# Patient Record
Sex: FEMALE | Race: WHITE | NOT HISPANIC OR LATINO | Employment: PART TIME | URBAN - NONMETROPOLITAN AREA
[De-identification: names, ages, dates, MRNs, and addresses within clinical notes are randomized per-mention and may not be internally consistent; named-entity substitution may affect disease eponyms.]

---

## 2020-06-05 ENCOUNTER — HOSPITAL ENCOUNTER (INPATIENT)
Facility: HOSPITAL | Age: 20
LOS: 1 days | DRG: 817 | End: 2020-06-07
Attending: EMERGENCY MEDICINE | Admitting: FAMILY MEDICINE
Payer: COMMERCIAL

## 2020-06-05 ENCOUNTER — APPOINTMENT (OUTPATIENT)
Dept: RADIOLOGY | Facility: HOSPITAL | Age: 20
DRG: 817 | End: 2020-06-05
Payer: COMMERCIAL

## 2020-06-05 DIAGNOSIS — I45.81 PROLONGED QT INTERVAL SYNDROME: ICD-10-CM

## 2020-06-05 DIAGNOSIS — T50.902A INTENTIONAL DRUG OVERDOSE, INITIAL ENCOUNTER (HCC): Primary | ICD-10-CM

## 2020-06-05 DIAGNOSIS — T14.91XA SUICIDE ATTEMPT (HCC): ICD-10-CM

## 2020-06-05 DIAGNOSIS — N39.0 UTI (URINARY TRACT INFECTION): ICD-10-CM

## 2020-06-05 PROBLEM — D72.829 LEUKOCYTOSIS: Status: ACTIVE | Noted: 2020-06-05

## 2020-06-05 PROBLEM — E87.6 HYPOKALEMIA: Status: ACTIVE | Noted: 2020-06-05

## 2020-06-05 PROBLEM — N30.00 ACUTE CYSTITIS WITHOUT HEMATURIA: Status: ACTIVE | Noted: 2020-06-05

## 2020-06-05 PROBLEM — R94.31 PROLONGED Q-T INTERVAL ON ECG: Status: ACTIVE | Noted: 2020-06-05

## 2020-06-05 LAB
ALBUMIN SERPL BCP-MCNC: 4.2 G/DL (ref 3.5–5)
ALP SERPL-CCNC: 53 U/L (ref 46–116)
ALT SERPL W P-5'-P-CCNC: 16 U/L (ref 12–78)
AMPHETAMINES SERPL QL SCN: NEGATIVE
ANION GAP SERPL CALCULATED.3IONS-SCNC: 14 MMOL/L (ref 4–13)
ANION GAP SERPL CALCULATED.3IONS-SCNC: 9 MMOL/L (ref 4–13)
APAP SERPL-MCNC: <2 UG/ML (ref 10–20)
APTT PPP: 31 SECONDS (ref 23–37)
AST SERPL W P-5'-P-CCNC: 18 U/L (ref 5–45)
ATRIAL RATE: 94 BPM
BACTERIA UR QL AUTO: ABNORMAL /HPF
BARBITURATES UR QL: NEGATIVE
BASOPHILS # BLD AUTO: 0.06 THOUSANDS/ΜL (ref 0–0.1)
BASOPHILS # BLD AUTO: 0.11 THOUSANDS/ΜL (ref 0–0.1)
BASOPHILS NFR BLD AUTO: 1 % (ref 0–1)
BASOPHILS NFR BLD AUTO: 1 % (ref 0–1)
BENZODIAZ UR QL: NEGATIVE
BILIRUB SERPL-MCNC: 0.8 MG/DL (ref 0.2–1)
BILIRUB UR QL STRIP: NEGATIVE
BUN SERPL-MCNC: 4 MG/DL (ref 5–25)
BUN SERPL-MCNC: 6 MG/DL (ref 5–25)
CALCIUM SERPL-MCNC: 7.8 MG/DL (ref 8.3–10.1)
CALCIUM SERPL-MCNC: 8.5 MG/DL (ref 8.3–10.1)
CHLORIDE SERPL-SCNC: 103 MMOL/L (ref 100–108)
CHLORIDE SERPL-SCNC: 106 MMOL/L (ref 100–108)
CLARITY UR: ABNORMAL
CO2 SERPL-SCNC: 22 MMOL/L (ref 21–32)
CO2 SERPL-SCNC: 24 MMOL/L (ref 21–32)
COCAINE UR QL: NEGATIVE
COLOR UR: YELLOW
CREAT SERPL-MCNC: 0.47 MG/DL (ref 0.6–1.3)
CREAT SERPL-MCNC: 0.69 MG/DL (ref 0.6–1.3)
EOSINOPHIL # BLD AUTO: 0.04 THOUSAND/ΜL (ref 0–0.61)
EOSINOPHIL # BLD AUTO: 0.62 THOUSAND/ΜL (ref 0–0.61)
EOSINOPHIL NFR BLD AUTO: 0 % (ref 0–6)
EOSINOPHIL NFR BLD AUTO: 5 % (ref 0–6)
ERYTHROCYTE [DISTWIDTH] IN BLOOD BY AUTOMATED COUNT: 11.7 % (ref 11.6–15.1)
ERYTHROCYTE [DISTWIDTH] IN BLOOD BY AUTOMATED COUNT: 11.7 % (ref 11.6–15.1)
ETHANOL SERPL-MCNC: <3 MG/DL (ref 0–3)
EXT PREG TEST URINE: NEGATIVE
EXT. CONTROL ED NAV: NORMAL
GFR SERPL CREATININE-BSD FRML MDRD: 124 ML/MIN/1.73SQ M
GFR SERPL CREATININE-BSD FRML MDRD: 141 ML/MIN/1.73SQ M
GLUCOSE P FAST SERPL-MCNC: 105 MG/DL (ref 65–99)
GLUCOSE SERPL-MCNC: 105 MG/DL (ref 65–140)
GLUCOSE SERPL-MCNC: 124 MG/DL (ref 65–140)
GLUCOSE UR STRIP-MCNC: NEGATIVE MG/DL
HCT VFR BLD AUTO: 36.7 % (ref 34.8–46.1)
HCT VFR BLD AUTO: 38.5 % (ref 34.8–46.1)
HGB BLD-MCNC: 12.6 G/DL (ref 11.5–15.4)
HGB BLD-MCNC: 12.8 G/DL (ref 11.5–15.4)
HGB UR QL STRIP.AUTO: ABNORMAL
IMM GRANULOCYTES # BLD AUTO: 0.03 THOUSAND/UL (ref 0–0.2)
IMM GRANULOCYTES # BLD AUTO: 0.03 THOUSAND/UL (ref 0–0.2)
IMM GRANULOCYTES NFR BLD AUTO: 0 % (ref 0–2)
IMM GRANULOCYTES NFR BLD AUTO: 0 % (ref 0–2)
INR PPP: 1.04 (ref 0.84–1.19)
KETONES UR STRIP-MCNC: ABNORMAL MG/DL
LEUKOCYTE ESTERASE UR QL STRIP: ABNORMAL
LYMPHOCYTES # BLD AUTO: 1.65 THOUSANDS/ΜL (ref 0.6–4.47)
LYMPHOCYTES # BLD AUTO: 2.43 THOUSANDS/ΜL (ref 0.6–4.47)
LYMPHOCYTES NFR BLD AUTO: 17 % (ref 14–44)
LYMPHOCYTES NFR BLD AUTO: 21 % (ref 14–44)
MAGNESIUM SERPL-MCNC: 2.3 MG/DL (ref 1.6–2.6)
MCH RBC QN AUTO: 30.7 PG (ref 26.8–34.3)
MCH RBC QN AUTO: 31.2 PG (ref 26.8–34.3)
MCHC RBC AUTO-ENTMCNC: 33.2 G/DL (ref 31.4–37.4)
MCHC RBC AUTO-ENTMCNC: 34.3 G/DL (ref 31.4–37.4)
MCV RBC AUTO: 91 FL (ref 82–98)
MCV RBC AUTO: 92 FL (ref 82–98)
METHADONE UR QL: NEGATIVE
MONOCYTES # BLD AUTO: 0.21 THOUSAND/ΜL (ref 0.17–1.22)
MONOCYTES # BLD AUTO: 0.52 THOUSAND/ΜL (ref 0.17–1.22)
MONOCYTES NFR BLD AUTO: 2 % (ref 4–12)
MONOCYTES NFR BLD AUTO: 5 % (ref 4–12)
NEUTROPHILS # BLD AUTO: 7.87 THOUSANDS/ΜL (ref 1.85–7.62)
NEUTROPHILS # BLD AUTO: 7.87 THOUSANDS/ΜL (ref 1.85–7.62)
NEUTS SEG NFR BLD AUTO: 68 % (ref 43–75)
NEUTS SEG NFR BLD AUTO: 80 % (ref 43–75)
NITRITE UR QL STRIP: POSITIVE
NON-SQ EPI CELLS URNS QL MICRO: ABNORMAL /HPF
NRBC BLD AUTO-RTO: 0 /100 WBCS
NRBC BLD AUTO-RTO: 0 /100 WBCS
OPIATES UR QL SCN: NEGATIVE
P AXIS: 72 DEGREES
PCP UR QL: NEGATIVE
PH UR STRIP.AUTO: 7.5 [PH]
PHOSPHATE SERPL-MCNC: 3 MG/DL (ref 2.7–4.5)
PLATELET # BLD AUTO: 238 THOUSANDS/UL (ref 149–390)
PLATELET # BLD AUTO: 248 THOUSANDS/UL (ref 149–390)
PMV BLD AUTO: 10 FL (ref 8.9–12.7)
PMV BLD AUTO: 10 FL (ref 8.9–12.7)
POTASSIUM SERPL-SCNC: 3.3 MMOL/L (ref 3.5–5.3)
POTASSIUM SERPL-SCNC: 4.2 MMOL/L (ref 3.5–5.3)
PR INTERVAL: 150 MS
PROCALCITONIN SERPL-MCNC: <0.05 NG/ML
PROT SERPL-MCNC: 7.2 G/DL (ref 6.4–8.2)
PROT UR STRIP-MCNC: NEGATIVE MG/DL
PROTHROMBIN TIME: 13.6 SECONDS (ref 11.6–14.5)
QRS AXIS: 72 DEGREES
QRSD INTERVAL: 82 MS
QT INTERVAL: 408 MS
QTC INTERVAL: 510 MS
RBC # BLD AUTO: 4.04 MILLION/UL (ref 3.81–5.12)
RBC # BLD AUTO: 4.17 MILLION/UL (ref 3.81–5.12)
RBC #/AREA URNS AUTO: ABNORMAL /HPF
SALICYLATES SERPL-MCNC: <3 MG/DL (ref 3–20)
SARS-COV-2 RNA RESP QL NAA+PROBE: NEGATIVE
SODIUM SERPL-SCNC: 139 MMOL/L (ref 136–145)
SODIUM SERPL-SCNC: 139 MMOL/L (ref 136–145)
SP GR UR STRIP.AUTO: 1.01 (ref 1–1.03)
T WAVE AXIS: 33 DEGREES
THC UR QL: NEGATIVE
TROPONIN I SERPL-MCNC: <0.02 NG/ML
TROPONIN I SERPL-MCNC: <0.02 NG/ML
UROBILINOGEN UR QL STRIP.AUTO: 0.2 E.U./DL
VENTRICULAR RATE: 94 BPM
WBC # BLD AUTO: 11.58 THOUSAND/UL (ref 4.31–10.16)
WBC # BLD AUTO: 9.86 THOUSAND/UL (ref 4.31–10.16)
WBC #/AREA URNS AUTO: ABNORMAL /HPF

## 2020-06-05 PROCEDURE — 93005 ELECTROCARDIOGRAM TRACING: CPT

## 2020-06-05 PROCEDURE — 84484 ASSAY OF TROPONIN QUANT: CPT | Performed by: FAMILY MEDICINE

## 2020-06-05 PROCEDURE — 84484 ASSAY OF TROPONIN QUANT: CPT | Performed by: EMERGENCY MEDICINE

## 2020-06-05 PROCEDURE — 36415 COLL VENOUS BLD VENIPUNCTURE: CPT | Performed by: EMERGENCY MEDICINE

## 2020-06-05 PROCEDURE — 83735 ASSAY OF MAGNESIUM: CPT | Performed by: PHYSICIAN ASSISTANT

## 2020-06-05 PROCEDURE — 99285 EMERGENCY DEPT VISIT HI MDM: CPT

## 2020-06-05 PROCEDURE — 80307 DRUG TEST PRSMV CHEM ANLYZR: CPT | Performed by: EMERGENCY MEDICINE

## 2020-06-05 PROCEDURE — 84100 ASSAY OF PHOSPHORUS: CPT | Performed by: PHYSICIAN ASSISTANT

## 2020-06-05 PROCEDURE — 85025 COMPLETE CBC W/AUTO DIFF WBC: CPT | Performed by: EMERGENCY MEDICINE

## 2020-06-05 PROCEDURE — 80048 BASIC METABOLIC PNL TOTAL CA: CPT | Performed by: FAMILY MEDICINE

## 2020-06-05 PROCEDURE — 99285 EMERGENCY DEPT VISIT HI MDM: CPT | Performed by: EMERGENCY MEDICINE

## 2020-06-05 PROCEDURE — 84145 PROCALCITONIN (PCT): CPT | Performed by: PHYSICIAN ASSISTANT

## 2020-06-05 PROCEDURE — 71045 X-RAY EXAM CHEST 1 VIEW: CPT

## 2020-06-05 PROCEDURE — 99202 OFFICE O/P NEW SF 15 MIN: CPT | Performed by: NURSE PRACTITIONER

## 2020-06-05 PROCEDURE — 87635 SARS-COV-2 COVID-19 AMP PRB: CPT | Performed by: EMERGENCY MEDICINE

## 2020-06-05 PROCEDURE — 80053 COMPREHEN METABOLIC PANEL: CPT | Performed by: EMERGENCY MEDICINE

## 2020-06-05 PROCEDURE — 80320 DRUG SCREEN QUANTALCOHOLS: CPT | Performed by: EMERGENCY MEDICINE

## 2020-06-05 PROCEDURE — 81025 URINE PREGNANCY TEST: CPT | Performed by: EMERGENCY MEDICINE

## 2020-06-05 PROCEDURE — 81001 URINALYSIS AUTO W/SCOPE: CPT | Performed by: PHYSICIAN ASSISTANT

## 2020-06-05 PROCEDURE — 85025 COMPLETE CBC W/AUTO DIFF WBC: CPT | Performed by: PHYSICIAN ASSISTANT

## 2020-06-05 PROCEDURE — 85610 PROTHROMBIN TIME: CPT | Performed by: EMERGENCY MEDICINE

## 2020-06-05 PROCEDURE — 99219 PR INITIAL OBSERVATION CARE/DAY 50 MINUTES: CPT | Performed by: FAMILY MEDICINE

## 2020-06-05 PROCEDURE — 93010 ELECTROCARDIOGRAM REPORT: CPT | Performed by: INTERNAL MEDICINE

## 2020-06-05 PROCEDURE — 85730 THROMBOPLASTIN TIME PARTIAL: CPT | Performed by: EMERGENCY MEDICINE

## 2020-06-05 PROCEDURE — 80329 ANALGESICS NON-OPIOID 1 OR 2: CPT | Performed by: EMERGENCY MEDICINE

## 2020-06-05 RX ORDER — ONDANSETRON 2 MG/ML
1 INJECTION INTRAMUSCULAR; INTRAVENOUS ONCE
Status: COMPLETED | OUTPATIENT
Start: 2020-06-05 | End: 2020-06-05

## 2020-06-05 RX ORDER — POTASSIUM CHLORIDE 20 MEQ/1
20 TABLET, EXTENDED RELEASE ORAL ONCE
Status: COMPLETED | OUTPATIENT
Start: 2020-06-05 | End: 2020-06-05

## 2020-06-05 RX ORDER — LORAZEPAM 2 MG/ML
1 INJECTION INTRAMUSCULAR ONCE
Status: COMPLETED | OUTPATIENT
Start: 2020-06-05 | End: 2020-06-05

## 2020-06-05 RX ORDER — KETOROLAC TROMETHAMINE 30 MG/ML
15 INJECTION, SOLUTION INTRAMUSCULAR; INTRAVENOUS EVERY 6 HOURS PRN
Status: DISCONTINUED | OUTPATIENT
Start: 2020-06-05 | End: 2020-06-07 | Stop reason: HOSPADM

## 2020-06-05 RX ORDER — ACETAMINOPHEN 325 MG/1
650 TABLET ORAL ONCE
Status: COMPLETED | OUTPATIENT
Start: 2020-06-05 | End: 2020-06-05

## 2020-06-05 RX ORDER — SODIUM CHLORIDE 9 MG/ML
125 INJECTION, SOLUTION INTRAVENOUS CONTINUOUS
Status: DISCONTINUED | OUTPATIENT
Start: 2020-06-05 | End: 2020-06-06

## 2020-06-05 RX ORDER — MAGNESIUM SULFATE HEPTAHYDRATE 40 MG/ML
2 INJECTION, SOLUTION INTRAVENOUS ONCE
Status: COMPLETED | OUTPATIENT
Start: 2020-06-05 | End: 2020-06-05

## 2020-06-05 RX ORDER — CEPHALEXIN 250 MG/1
500 CAPSULE ORAL ONCE
Status: COMPLETED | OUTPATIENT
Start: 2020-06-05 | End: 2020-06-05

## 2020-06-05 RX ADMIN — ACETAMINOPHEN 650 MG: 325 TABLET, FILM COATED ORAL at 13:13

## 2020-06-05 RX ADMIN — KETOROLAC TROMETHAMINE 15 MG: 30 INJECTION, SOLUTION INTRAMUSCULAR at 08:28

## 2020-06-05 RX ADMIN — SODIUM CHLORIDE 125 ML/HR: 0.9 INJECTION, SOLUTION INTRAVENOUS at 04:30

## 2020-06-05 RX ADMIN — MAGNESIUM SULFATE HEPTAHYDRATE 2 G: 40 INJECTION, SOLUTION INTRAVENOUS at 01:34

## 2020-06-05 RX ADMIN — CEPHALEXIN 500 MG: 250 CAPSULE ORAL at 05:59

## 2020-06-05 RX ADMIN — SODIUM CHLORIDE 1000 ML: 0.9 INJECTION, SOLUTION INTRAVENOUS at 01:32

## 2020-06-05 RX ADMIN — POTASSIUM CHLORIDE 20 MEQ: 1500 TABLET, EXTENDED RELEASE ORAL at 05:59

## 2020-06-05 RX ADMIN — POTASSIUM CHLORIDE 20 MEQ: 1500 TABLET, EXTENDED RELEASE ORAL at 01:35

## 2020-06-05 RX ADMIN — LORAZEPAM 1 MG: 2 INJECTION INTRAMUSCULAR; INTRAVENOUS at 03:18

## 2020-06-06 ENCOUNTER — TELEPHONE (OUTPATIENT)
Dept: CASE MANAGEMENT | Facility: HOSPITAL | Age: 20
End: 2020-06-06

## 2020-06-06 PROCEDURE — 93005 ELECTROCARDIOGRAM TRACING: CPT

## 2020-06-06 PROCEDURE — 99232 SBSQ HOSP IP/OBS MODERATE 35: CPT | Performed by: FAMILY MEDICINE

## 2020-06-06 RX ORDER — ONDANSETRON 4 MG/1
4 TABLET, ORALLY DISINTEGRATING ORAL EVERY 6 HOURS PRN
Status: DISCONTINUED | OUTPATIENT
Start: 2020-06-06 | End: 2020-06-07 | Stop reason: HOSPADM

## 2020-06-06 RX ORDER — ACETAMINOPHEN 325 MG/1
650 TABLET ORAL EVERY 6 HOURS PRN
Status: DISCONTINUED | OUTPATIENT
Start: 2020-06-06 | End: 2020-06-07 | Stop reason: HOSPADM

## 2020-06-06 RX ORDER — CEPHALEXIN 250 MG/1
500 CAPSULE ORAL EVERY 8 HOURS SCHEDULED
Status: DISCONTINUED | OUTPATIENT
Start: 2020-06-06 | End: 2020-06-07 | Stop reason: HOSPADM

## 2020-06-06 RX ADMIN — SODIUM CHLORIDE 125 ML/HR: 0.9 INJECTION, SOLUTION INTRAVENOUS at 08:43

## 2020-06-06 RX ADMIN — CEPHALEXIN 500 MG: 250 CAPSULE ORAL at 14:54

## 2020-06-06 RX ADMIN — CEPHALEXIN 500 MG: 250 CAPSULE ORAL at 21:16

## 2020-06-06 RX ADMIN — ACETAMINOPHEN 650 MG: 325 TABLET, FILM COATED ORAL at 09:50

## 2020-06-06 RX ADMIN — SODIUM CHLORIDE 125 ML/HR: 0.9 INJECTION, SOLUTION INTRAVENOUS at 00:35

## 2020-06-06 RX ADMIN — ACETAMINOPHEN 650 MG: 325 TABLET, FILM COATED ORAL at 16:14

## 2020-06-06 RX ADMIN — CEPHALEXIN 500 MG: 250 CAPSULE ORAL at 09:33

## 2020-06-06 RX ADMIN — KETOROLAC TROMETHAMINE 15 MG: 30 INJECTION, SOLUTION INTRAMUSCULAR at 18:21

## 2020-06-06 RX ADMIN — ONDANSETRON 4 MG: 4 TABLET, ORALLY DISINTEGRATING ORAL at 10:02

## 2020-06-07 VITALS
RESPIRATION RATE: 18 BRPM | DIASTOLIC BLOOD PRESSURE: 68 MMHG | WEIGHT: 99.43 LBS | BODY MASS INDEX: 18.3 KG/M2 | TEMPERATURE: 98.1 F | OXYGEN SATURATION: 98 % | HEIGHT: 62 IN | HEART RATE: 64 BPM | SYSTOLIC BLOOD PRESSURE: 119 MMHG

## 2020-06-07 LAB
ATRIAL RATE: 72 BPM
P AXIS: 70 DEGREES
PR INTERVAL: 158 MS
QRS AXIS: 65 DEGREES
QRSD INTERVAL: 78 MS
QT INTERVAL: 424 MS
QTC INTERVAL: 464 MS
T WAVE AXIS: 36 DEGREES
VENTRICULAR RATE: 72 BPM

## 2020-06-07 PROCEDURE — 99239 HOSP IP/OBS DSCHRG MGMT >30: CPT | Performed by: FAMILY MEDICINE

## 2020-06-07 PROCEDURE — 93010 ELECTROCARDIOGRAM REPORT: CPT | Performed by: INTERNAL MEDICINE

## 2020-06-07 RX ORDER — CEPHALEXIN 500 MG/1
500 CAPSULE ORAL EVERY 8 HOURS SCHEDULED
Qty: 6 CAPSULE | Refills: 0
Start: 2020-06-07 | End: 2020-06-09

## 2020-06-07 RX ADMIN — ONDANSETRON 4 MG: 4 TABLET, ORALLY DISINTEGRATING ORAL at 08:11

## 2020-06-07 RX ADMIN — CEPHALEXIN 500 MG: 250 CAPSULE ORAL at 05:29

## 2020-06-08 LAB
ATRIAL RATE: 107 BPM
P AXIS: 75 DEGREES
PR INTERVAL: 160 MS
QRS AXIS: 78 DEGREES
QRSD INTERVAL: 78 MS
QT INTERVAL: 364 MS
QTC INTERVAL: 485 MS
T WAVE AXIS: 55 DEGREES
VENTRICULAR RATE: 107 BPM

## 2020-06-08 PROCEDURE — 93010 ELECTROCARDIOGRAM REPORT: CPT | Performed by: INTERNAL MEDICINE

## 2020-07-25 ENCOUNTER — HOSPITAL ENCOUNTER (EMERGENCY)
Facility: HOSPITAL | Age: 20
Discharge: HOME | End: 2020-07-25
Admitting: EMERGENCY MEDICINE
Payer: COMMERCIAL

## 2020-07-25 VITALS
BODY MASS INDEX: 17.66 KG/M2 | WEIGHT: 96 LBS | HEIGHT: 62 IN | OXYGEN SATURATION: 99 % | HEART RATE: 87 BPM | SYSTOLIC BLOOD PRESSURE: 124 MMHG | TEMPERATURE: 99.7 F | DIASTOLIC BLOOD PRESSURE: 58 MMHG | RESPIRATION RATE: 18 BRPM

## 2020-07-25 LAB — SARS-COV-2 RNA RESP QL NAA+PROBE: NEGATIVE

## 2020-07-25 PROCEDURE — 87635 SARS-COV-2 COVID-19 AMP PRB: CPT | Performed by: EMERGENCY MEDICINE

## 2020-07-25 PROCEDURE — 99283 EMERGENCY DEPT VISIT LOW MDM: CPT

## 2020-07-25 PROCEDURE — NC001 PR NO CHARGE: Performed by: EMERGENCY MEDICINE

## 2020-07-25 RX ORDER — ARIPIPRAZOLE 15 MG/1
15 TABLET ORAL DAILY
COMMUNITY
End: 2021-03-22

## 2020-07-25 RX ORDER — OMEGA-3S/DHA/EPA/FISH OIL/D3 300MG-1000
CAPSULE ORAL DAILY
COMMUNITY
End: 2022-07-08 | Stop reason: ALTCHOICE

## 2020-07-25 RX ORDER — SERTRALINE HYDROCHLORIDE 100 MG/1
100 TABLET, FILM COATED ORAL DAILY
COMMUNITY
End: 2022-07-08 | Stop reason: ALTCHOICE

## 2020-07-25 NOTE — ED NOTES
Pt  States was exposed to a person that had +Covid 4 days ago and just found out today  Denies any complaints  No fever, SOB, or cough  Mask on from arrival   Pt  Placed in car and was instructed that we will come and get her for ED treatment  Agreeable to same       Nadeem Bernabe RN  07/25/20 4720

## 2020-07-26 NOTE — ED NOTES
Pt came in for covid testing only, no S? S of Covid 19, was around someone who had it Dr Amelia Rojo aware     Lin Cote, RN  07/25/20 2004

## 2020-07-31 NOTE — ED PROVIDER NOTES
Patient inadvertently left after triage and first nurse orders but prior to being seen by provider       Yinka Cunningham MD  07/31/20 6291

## 2020-09-19 ENCOUNTER — HOSPITAL ENCOUNTER (EMERGENCY)
Facility: HOSPITAL | Age: 20
Discharge: HOME | End: 2020-09-19
Attending: EMERGENCY MEDICINE | Admitting: EMERGENCY MEDICINE
Payer: COMMERCIAL

## 2020-09-19 VITALS
HEIGHT: 62 IN | BODY MASS INDEX: 19.32 KG/M2 | HEART RATE: 98 BPM | OXYGEN SATURATION: 96 % | SYSTOLIC BLOOD PRESSURE: 123 MMHG | WEIGHT: 105 LBS | DIASTOLIC BLOOD PRESSURE: 66 MMHG | TEMPERATURE: 99 F | RESPIRATION RATE: 18 BRPM

## 2020-09-19 DIAGNOSIS — J20.9 ACUTE BRONCHITIS WITH BRONCHOSPASM: Primary | ICD-10-CM

## 2020-09-19 PROCEDURE — 99284 EMERGENCY DEPT VISIT MOD MDM: CPT | Performed by: EMERGENCY MEDICINE

## 2020-09-19 PROCEDURE — 99283 EMERGENCY DEPT VISIT LOW MDM: CPT

## 2020-09-19 PROCEDURE — U0003 INFECTIOUS AGENT DETECTION BY NUCLEIC ACID (DNA OR RNA); SEVERE ACUTE RESPIRATORY SYNDROME CORONAVIRUS 2 (SARS-COV-2) (CORONAVIRUS DISEASE [COVID-19]), AMPLIFIED PROBE TECHNIQUE, MAKING USE OF HIGH THROUGHPUT TECHNOLOGIES AS DESCRIBED BY CMS-2020-01-R: HCPCS | Performed by: EMERGENCY MEDICINE

## 2020-09-19 RX ORDER — ALBUTEROL SULFATE 90 UG/1
2 AEROSOL, METERED RESPIRATORY (INHALATION) ONCE
Status: COMPLETED | OUTPATIENT
Start: 2020-09-19 | End: 2020-09-19

## 2020-09-19 RX ORDER — AZITHROMYCIN 250 MG/1
250 TABLET, FILM COATED ORAL DAILY
Qty: 4 TABLET | Refills: 0 | Status: SHIPPED | OUTPATIENT
Start: 2020-09-19 | End: 2020-09-23

## 2020-09-19 RX ORDER — DEXAMETHASONE 2 MG/1
TABLET ORAL
Qty: 5 TABLET | Refills: 0 | Status: SHIPPED | OUTPATIENT
Start: 2020-09-19 | End: 2022-07-08 | Stop reason: ALTCHOICE

## 2020-09-19 RX ORDER — ALBUTEROL SULFATE 90 UG/1
2 AEROSOL, METERED RESPIRATORY (INHALATION) EVERY 4 HOURS PRN
Qty: 1 INHALER | Refills: 0 | Status: SHIPPED | OUTPATIENT
Start: 2020-09-19 | End: 2021-09-19

## 2020-09-19 RX ORDER — AZITHROMYCIN 250 MG/1
500 TABLET, FILM COATED ORAL ONCE
Status: COMPLETED | OUTPATIENT
Start: 2020-09-19 | End: 2020-09-19

## 2020-09-19 RX ORDER — PROMETHAZINE HYDROCHLORIDE 25 MG/1
25 TABLET ORAL EVERY 6 HOURS PRN
Qty: 6 TABLET | Refills: 0 | Status: SHIPPED | OUTPATIENT
Start: 2020-09-19 | End: 2022-07-08 | Stop reason: ALTCHOICE

## 2020-09-19 RX ADMIN — DEXAMETHASONE SODIUM PHOSPHATE 10 MG: 10 INJECTION, SOLUTION INTRAMUSCULAR; INTRAVENOUS at 22:44

## 2020-09-19 RX ADMIN — AZITHROMYCIN 500 MG: 250 TABLET, FILM COATED ORAL at 22:43

## 2020-09-19 RX ADMIN — ALBUTEROL SULFATE 2 PUFF: 90 AEROSOL, METERED RESPIRATORY (INHALATION) at 22:44

## 2020-09-20 NOTE — ED PROVIDER NOTES
History  Chief Complaint   Patient presents with    URI     patient reports sore throat, cough, fever, reunny nose, and vomiting since yesterday  she last took nyquial at 80     68-year-old female with 2 day history of cough sore throat and congestion  She has some sinus pressure no earache her T-max was approximately 100 7° earlier in the day coughing is to the point of vomiting overall cough nonproductive she denies any hematemesis she has been able to tolerate fluids she denies abdominal pain or diarrhea no history of rash she does not feel short of breath no pleuritic pain but has noted some wheezing  She denies any lightheadedness her last menstrual period was 2-3 weeks ago she vapes  Prior to Admission Medications   Prescriptions Last Dose Informant Patient Reported? Taking? ARIPiprazole (ABILIFY) 15 mg tablet 9/19/2020 at Unknown time Self Yes Yes   Sig: Take 15 mg by mouth daily   DM-Doxylamine-Acetaminophen (NYQUIL COLD & FLU PO) 9/19/2020 at 2030  Yes Yes   Sig: Take by mouth   cholecalciferol (VITAMIN D3) 400 units tablet 9/19/2020 at Unknown time Self Yes Yes   Sig: Take by mouth daily   sertraline (ZOLOFT) 100 mg tablet 9/19/2020 at Unknown time Self Yes Yes   Sig: Take 100 mg by mouth daily      Facility-Administered Medications: None       History reviewed  No pertinent past medical history  History reviewed  No pertinent surgical history  History reviewed  No pertinent family history  I have reviewed and agree with the history as documented  E-Cigarette/Vaping    E-Cigarette Use Current Every Day User      E-Cigarette/Vaping Substances    Nicotine Yes     THC No     CBD No     Flavoring Yes     Other No     Unknown No      Social History     Tobacco Use    Smoking status: Never Smoker    Smokeless tobacco: Never Used   Substance Use Topics    Alcohol use: Yes     Frequency: Monthly or less     Drinks per session: 1 or 2     Binge frequency: Never    Drug use:  Yes Types: Marijuana     Comment: 2-3x a week        Review of Systems   Constitutional: Negative for activity change, appetite change, chills and fever  HENT: Positive for congestion, sinus pressure and sore throat  Negative for ear pain, facial swelling, hearing loss, mouth sores, sinus pain, sneezing, trouble swallowing and voice change  Eyes: Negative for discharge  Respiratory: Positive for cough and wheezing  Negative for chest tightness and shortness of breath  Cardiovascular: Negative for chest pain and leg swelling  Gastrointestinal: Positive for vomiting  Negative for abdominal pain, diarrhea and nausea  Genitourinary: Negative for decreased urine volume, difficulty urinating and dysuria  Musculoskeletal: Negative for back pain, neck pain and neck stiffness  Skin: Negative for rash  Neurological: Negative for dizziness, weakness, light-headedness and headaches  Psychiatric/Behavioral: Negative for confusion  All other systems reviewed and are negative  Physical Exam  Physical Exam  Vitals signs and nursing note reviewed  Constitutional:       General: She is not in acute distress  Appearance: She is well-developed  She is not diaphoretic  Comments: texting on phone   HENT:      Head: Normocephalic and atraumatic  Comments: No tenderness with palpation of sinuses     Right Ear: Tympanic membrane, ear canal and external ear normal       Left Ear: Tympanic membrane, ear canal and external ear normal       Nose: Nose normal       Mouth/Throat:      Mouth: Mucous membranes are moist       Pharynx: No oropharyngeal exudate or posterior oropharyngeal erythema  Eyes:      General:         Right eye: No discharge  Left eye: No discharge  Conjunctiva/sclera: Conjunctivae normal       Pupils: Pupils are equal, round, and reactive to light  Neck:      Musculoskeletal: Normal range of motion and neck supple  No neck rigidity or muscular tenderness  Comments: No midline or paraspinous tenderness  Cardiovascular:      Rate and Rhythm: Normal rate and regular rhythm  Heart sounds: Normal heart sounds  Pulmonary:      Effort: Pulmonary effort is normal  No respiratory distress  Breath sounds: Wheezing present  Comments: sats 98%  Abdominal:      General: Bowel sounds are normal  There is no distension  Palpations: Abdomen is soft  There is no mass  Tenderness: There is no abdominal tenderness  There is no right CVA tenderness, left CVA tenderness, guarding or rebound  Comments: Back no midline or CVA tenderness   Musculoskeletal: Normal range of motion  General: No swelling, tenderness or deformity  Right lower leg: No edema  Left lower leg: No edema  Lymphadenopathy:      Cervical: No cervical adenopathy  Skin:     General: Skin is warm and dry  Neurological:      Mental Status: She is alert and oriented to person, place, and time  Cranial Nerves: No cranial nerve deficit  Motor: No abnormal muscle tone        Coordination: Coordination normal       Comments: Gait steady         Vital Signs  ED Triage Vitals [09/19/20 2211]   Temperature Pulse Respirations Blood Pressure SpO2   99 °F (37 2 °C) 98 18 123/66 96 %      Temp Source Heart Rate Source Patient Position - Orthostatic VS BP Location FiO2 (%)   Temporal Monitor Sitting Left arm --      Pain Score       4           Vitals:    09/19/20 2211   BP: 123/66   Pulse: 98   Patient Position - Orthostatic VS: Sitting         Visual Acuity      ED Medications  Medications   azithromycin (ZITHROMAX) tablet 500 mg (500 mg Oral Given 9/19/20 2243)   dexamethasone 10 mg/mL oral liquid 10 mg 1 mL (10 mg Oral Given 9/19/20 2244)   albuterol (PROVENTIL HFA,VENTOLIN HFA) inhaler 2 puff (2 puffs Inhalation Given 9/19/20 2244)       Diagnostic Studies  Results Reviewed     Procedure Component Value Units Date/Time    Novel Coronavirus (COVID-19), PCR [facility] [[id number]] Collected:  09/19/20 2247    Lab Status: In process Specimen:  Nares from Nose Updated:  09/19/20 2251                 No orders to display              Procedures  Procedures         ED Course  ED Course as of Sep 20 0009   Sat Sep 19, 2020   2246 Prior records reviewed patient had prolonged QTC of 510 secondary to SSRI overdose 6 patient had serial EKGs which were QTC normalized to 646  Will prescribe Zithromax but used Phenergan for nausea                               SBIRT 22yo+      Most Recent Value   SBIRT (22 yo +)   In order to provide better care to our patients, we are screening all of our patients for alcohol and drug use  Would it be okay to ask you these screening questions? Yes Filed at: 09/19/2020 2223   Initial Alcohol Screen: US AUDIT-C    1  How often do you have a drink containing alcohol? 1 Filed at: 09/19/2020 2223   2  How many drinks containing alcohol do you have on a typical day you are drinking? 0 Filed at: 09/19/2020 2223   3a  Male UNDER 65: How often do you have five or more drinks on one occasion? 0 Filed at: 09/19/2020 2223   3b  FEMALE Any Age, or MALE 65+: How often do you have 4 or more drinks on one occassion? 0 Filed at: 09/19/2020 2223   Audit-C Score  1 Filed at: 09/19/2020 2223   MANAS: How many times in the past year have you    Used an illegal drug or used a prescription medication for non-medical reasons? Never Filed at: 09/19/2020 2223                  MDM  Number of Diagnoses or Management Options  Acute bronchitis with bronchospasm:   Diagnosis management comments: Mdm:  History of vaping with no respiratory distress but evidence of wheezing will cover with Zithromax  Burst with steroids administer albuterol will have Phenergan available on an as-needed basis for nausea reviewed symptoms may be consistent with COVID infection patient is agreeable to proceeding with a swab    Reviewed isolation precautions      Disposition  Final diagnoses:   Acute bronchitis with bronchospasm     Time reflects when diagnosis was documented in both MDM as applicable and the Disposition within this note     Time User Action Codes Description Comment    9/19/2020 10:35 PM Chicagosolitario Pineda Add [J20 9] Acute bronchitis with bronchospasm       ED Disposition     ED Disposition Condition Date/Time Comment    Discharge Stable Sat Sep 19, 2020 10:44 PM Linh Zavala discharge to home/self care  Follow-up Information     Follow up With Specialties Details Why [address], DO Family Medicine In 3 days if not improved [address] [facility]  [address]  [address] [place] [address]  [phone]            Discharge Medication List as of 9/19/2020 10:46 PM      START taking these medications    Details   albuterol (PROVENTIL HFA,VENTOLIN HFA) 90 mcg/act inhaler Inhale 2 puffs every 4 (four) hours as needed for wheezing, Starting Sat 9/19/2020, Until Sun 9/19/2021, Normal      azithromycin (Zithromax Z-Jw) 250 mg tablet Take 1 tablet (250 mg total) by mouth daily for 4 days, Starting Sat 9/19/2020, Until Wed 9/23/2020, Normal      dexamethasone (DECADRON) 2 mg tablet Take with food  Take one time dose of 10mg by mouth tomorrow, Normal      promethazine (PHENERGAN) 25 mg tablet Take 1 tablet (25 mg total) by mouth every 6 (six) hours as needed for nausea or vomiting, Starting Sat 9/19/2020, Normal         CONTINUE these medications which have NOT CHANGED    Details   ARIPiprazole (ABILIFY) 15 mg tablet Take 15 mg by mouth daily, Historical Med      cholecalciferol (VITAMIN D3) 400 units tablet Take by mouth daily, Historical Med      DM-Doxylamine-Acetaminophen (NYQUIL COLD & FLU PO) Take by mouth, Historical Med      sertraline (ZOLOFT) 100 mg tablet Take 100 mg by mouth daily, Historical Med           No discharge procedures on file      PDMP Review     None          ED Provider  Electronically Signed by           Veronica Fields MD  09/20/20 9180

## 2020-09-20 NOTE — DISCHARGE INSTRUCTIONS
Plenty of fluids  Finished for additional days of Zithromax  Albuterol meter dose inhaler 2 puffs 4 times a day for 3 days scheduled and every 4 hours as needed  Additional 10 mg dose of dexamethasone with food tomorrow  Until COVID results available self isolate wear a mask anyone near you must wear mask as well  Phenergan 4 times a day as needed as needed for nausea may cause drowsiness no drinking driving or heavy machinery use  Return with needing albuterol more often than every 4 hours inability keep fluids down shortness of breath or any new or worsening symptoms

## 2020-09-22 LAB — SARS-COV-2 RNA SPEC QL NAA+PROBE: NOT DETECTED

## 2021-03-22 ENCOUNTER — APPOINTMENT (EMERGENCY)
Dept: CT IMAGING | Facility: HOSPITAL | Age: 21
End: 2021-03-22
Payer: COMMERCIAL

## 2021-03-22 ENCOUNTER — HOSPITAL ENCOUNTER (EMERGENCY)
Facility: HOSPITAL | Age: 21
Discharge: HOME | End: 2021-03-23
Attending: EMERGENCY MEDICINE
Payer: COMMERCIAL

## 2021-03-22 DIAGNOSIS — E86.0 DEHYDRATION: ICD-10-CM

## 2021-03-22 DIAGNOSIS — R11.2 NAUSEA & VOMITING: Primary | ICD-10-CM

## 2021-03-22 LAB
ALBUMIN SERPL BCP-MCNC: 4.8 G/DL (ref 3.5–5.7)
ALP SERPL-CCNC: 37 U/L (ref 40–150)
ALT SERPL W P-5'-P-CCNC: 67 U/L (ref 7–52)
ANION GAP SERPL CALCULATED.3IONS-SCNC: 17 MMOL/L (ref 4–13)
APTT PPP: 29 SECONDS (ref 23–37)
AST SERPL W P-5'-P-CCNC: 58 U/L (ref 13–39)
BASOPHILS # BLD AUTO: 0.1 THOUSANDS/ΜL (ref 0–0.1)
BASOPHILS NFR BLD AUTO: 0 % (ref 0–2)
BILIRUB SERPL-MCNC: 1 MG/DL (ref 0.2–1)
BUN SERPL-MCNC: 16 MG/DL (ref 7–25)
CALCIUM SERPL-MCNC: 9.8 MG/DL (ref 8.6–10.5)
CHLORIDE SERPL-SCNC: 103 MMOL/L (ref 98–107)
CO2 SERPL-SCNC: 17 MMOL/L (ref 21–31)
CREAT SERPL-MCNC: 0.62 MG/DL (ref 0.6–1.2)
EOSINOPHIL # BLD AUTO: 0 THOUSAND/ΜL (ref 0–0.61)
EOSINOPHIL NFR BLD AUTO: 0 % (ref 0–5)
ERYTHROCYTE [DISTWIDTH] IN BLOOD BY AUTOMATED COUNT: 12.8 % (ref 11.5–14.5)
GFR SERPL CREATININE-BSD FRML MDRD: 128 ML/MIN/1.73SQ M
GLUCOSE SERPL-MCNC: 82 MG/DL (ref 65–99)
HCG SERPL QL: NEGATIVE
HCT VFR BLD AUTO: 41.4 % (ref 42–47)
HGB BLD-MCNC: 13.7 G/DL (ref 12–16)
INR PPP: 1.1 (ref 0.84–1.19)
LYMPHOCYTES # BLD AUTO: 1.4 THOUSANDS/ΜL (ref 0.6–4.47)
LYMPHOCYTES NFR BLD AUTO: 8 % (ref 21–51)
MCH RBC QN AUTO: 30.6 PG (ref 26–34)
MCHC RBC AUTO-ENTMCNC: 33.2 G/DL (ref 31–37)
MCV RBC AUTO: 92 FL (ref 81–99)
MONOCYTES # BLD AUTO: 0.4 THOUSAND/ΜL (ref 0.17–1.22)
MONOCYTES NFR BLD AUTO: 2 % (ref 2–12)
NEUTROPHILS # BLD AUTO: 14.8 THOUSANDS/ΜL (ref 1.4–6.5)
NEUTS SEG NFR BLD AUTO: 89 % (ref 42–75)
PLATELET # BLD AUTO: 272 THOUSANDS/UL (ref 149–390)
PMV BLD AUTO: 8.7 FL (ref 8.6–11.7)
POTASSIUM SERPL-SCNC: 3.9 MMOL/L (ref 3.5–5.5)
PROT SERPL-MCNC: 7.7 G/DL (ref 6.4–8.9)
PROTHROMBIN TIME: 14.1 SECONDS (ref 11.6–14.5)
RBC # BLD AUTO: 4.49 MILLION/UL (ref 3.9–5.2)
SODIUM SERPL-SCNC: 137 MMOL/L (ref 134–143)
WBC # BLD AUTO: 16.6 THOUSAND/UL (ref 4.8–10.8)

## 2021-03-22 PROCEDURE — 36415 COLL VENOUS BLD VENIPUNCTURE: CPT | Performed by: EMERGENCY MEDICINE

## 2021-03-22 PROCEDURE — 96361 HYDRATE IV INFUSION ADD-ON: CPT

## 2021-03-22 PROCEDURE — 84703 CHORIONIC GONADOTROPIN ASSAY: CPT | Performed by: EMERGENCY MEDICINE

## 2021-03-22 PROCEDURE — 80053 COMPREHEN METABOLIC PANEL: CPT | Performed by: EMERGENCY MEDICINE

## 2021-03-22 PROCEDURE — 99284 EMERGENCY DEPT VISIT MOD MDM: CPT

## 2021-03-22 PROCEDURE — 85730 THROMBOPLASTIN TIME PARTIAL: CPT | Performed by: EMERGENCY MEDICINE

## 2021-03-22 PROCEDURE — 99284 EMERGENCY DEPT VISIT MOD MDM: CPT | Performed by: EMERGENCY MEDICINE

## 2021-03-22 PROCEDURE — 85025 COMPLETE CBC W/AUTO DIFF WBC: CPT | Performed by: EMERGENCY MEDICINE

## 2021-03-22 PROCEDURE — 0241U HB NFCT DS VIR RESP RNA 4 TRGT: CPT | Performed by: EMERGENCY MEDICINE

## 2021-03-22 PROCEDURE — 96374 THER/PROPH/DIAG INJ IV PUSH: CPT

## 2021-03-22 PROCEDURE — 85610 PROTHROMBIN TIME: CPT | Performed by: EMERGENCY MEDICINE

## 2021-03-22 RX ORDER — QUETIAPINE FUMARATE 100 MG/1
100 TABLET, FILM COATED ORAL
COMMUNITY
End: 2022-07-08 | Stop reason: ALTCHOICE

## 2021-03-22 RX ORDER — ONDANSETRON 2 MG/ML
4 INJECTION INTRAMUSCULAR; INTRAVENOUS ONCE
Status: COMPLETED | OUTPATIENT
Start: 2021-03-22 | End: 2021-03-22

## 2021-03-22 RX ADMIN — SODIUM CHLORIDE 1000 ML: 0.9 INJECTION, SOLUTION INTRAVENOUS at 22:27

## 2021-03-22 RX ADMIN — ONDANSETRON 4 MG: 2 INJECTION INTRAMUSCULAR; INTRAVENOUS at 22:25

## 2021-03-23 ENCOUNTER — APPOINTMENT (EMERGENCY)
Dept: CT IMAGING | Facility: HOSPITAL | Age: 21
End: 2021-03-23
Payer: COMMERCIAL

## 2021-03-23 VITALS
DIASTOLIC BLOOD PRESSURE: 61 MMHG | HEART RATE: 91 BPM | OXYGEN SATURATION: 100 % | HEIGHT: 62 IN | BODY MASS INDEX: 17.11 KG/M2 | RESPIRATION RATE: 18 BRPM | WEIGHT: 93 LBS | SYSTOLIC BLOOD PRESSURE: 119 MMHG

## 2021-03-23 LAB
BACTERIA UR QL AUTO: ABNORMAL /HPF
BILIRUB UR QL STRIP: NEGATIVE
CLARITY UR: ABNORMAL
COLOR UR: YELLOW
FLUAV RNA RESP QL NAA+PROBE: NEGATIVE
FLUBV RNA RESP QL NAA+PROBE: NEGATIVE
GLUCOSE UR STRIP-MCNC: NEGATIVE MG/DL
HGB UR QL STRIP.AUTO: NEGATIVE
KETONES UR STRIP-MCNC: ABNORMAL MG/DL
LEUKOCYTE ESTERASE UR QL STRIP: NEGATIVE
MUCOUS THREADS UR QL AUTO: ABNORMAL
NITRITE UR QL STRIP: NEGATIVE
NON-SQ EPI CELLS URNS QL MICRO: ABNORMAL /HPF
PH UR STRIP.AUTO: 6 [PH]
PROT UR STRIP-MCNC: ABNORMAL MG/DL
RBC #/AREA URNS AUTO: ABNORMAL /HPF
RSV RNA RESP QL NAA+PROBE: NEGATIVE
SARS-COV-2 RNA RESP QL NAA+PROBE: NEGATIVE
SP GR UR STRIP.AUTO: >=1.03 (ref 1–1.03)
UROBILINOGEN UR QL STRIP.AUTO: 0.2 E.U./DL
WBC #/AREA URNS AUTO: ABNORMAL /HPF

## 2021-03-23 PROCEDURE — 81001 URINALYSIS AUTO W/SCOPE: CPT | Performed by: EMERGENCY MEDICINE

## 2021-03-23 PROCEDURE — 81003 URINALYSIS AUTO W/O SCOPE: CPT | Performed by: EMERGENCY MEDICINE

## 2021-03-23 PROCEDURE — 96361 HYDRATE IV INFUSION ADD-ON: CPT

## 2021-03-23 PROCEDURE — 74176 CT ABD & PELVIS W/O CONTRAST: CPT

## 2021-03-23 RX ORDER — ONDANSETRON 4 MG/1
4 TABLET, FILM COATED ORAL EVERY 6 HOURS
Qty: 12 TABLET | Refills: 0 | Status: SHIPPED | OUTPATIENT
Start: 2021-03-23

## 2021-03-23 RX ADMIN — SODIUM CHLORIDE 1000 ML: 0.9 INJECTION, SOLUTION INTRAVENOUS at 00:26

## 2021-03-23 RX ADMIN — SODIUM CHLORIDE 1000 ML: 0.9 INJECTION, SOLUTION INTRAVENOUS at 01:45

## 2021-03-23 NOTE — ED PROVIDER NOTES
History  Chief Complaint   Patient presents with    Vomiting     "too many too count" episodes in the past 24 hours  pt states unable to keep down any fluids  pt stopped taking zoloft and seroquel a few days prior to the vomiting     With vomiting for about 24 hours, numerous times and all wo bld  She had same  Diarrhea earlier in the week  Mild cough, non productive  No uti sx  denies preg  No fever or chills  abd pain seems to come and go  Prior to Admission Medications   Prescriptions Last Dose Informant Patient Reported? Taking? DM-Doxylamine-Acetaminophen (NYQUIL COLD & FLU PO) Not Taking at Unknown time  Yes No   Sig: Take by mouth   QUEtiapine (SEROquel) 100 mg tablet Past Week at Unknown time  Yes Yes   Sig: Take 100 mg by mouth daily at bedtime   albuterol (PROVENTIL HFA,VENTOLIN HFA) 90 mcg/act inhaler Not Taking at Unknown time  No No   Sig: Inhale 2 puffs every 4 (four) hours as needed for wheezing   Patient not taking: Reported on 3/22/2021   cholecalciferol (VITAMIN D3) 400 units tablet Not Taking at Unknown time Self Yes No   Sig: Take by mouth daily   dexamethasone (DECADRON) 2 mg tablet Not Taking at Unknown time  No No   Sig: Take with food  Take one time dose of 10mg by mouth tomorrow   Patient not taking: Reported on 3/22/2021   promethazine (PHENERGAN) 25 mg tablet Not Taking at Unknown time  No No   Sig: Take 1 tablet (25 mg total) by mouth every 6 (six) hours as needed for nausea or vomiting   Patient not taking: Reported on 3/22/2021   sertraline (ZOLOFT) 100 mg tablet Past Week at Unknown time Self Yes Yes   Sig: Take 100 mg by mouth daily      Facility-Administered Medications: None       Past Medical History:   Diagnosis Date    Anxiety     Asthma     Depression     Substance abuse ([facility] [address] )        History reviewed  No pertinent surgical history  History reviewed  No pertinent family history  I have reviewed and agree with the history as documented      E-Cigarette/Vaping  E-Cigarette Use Current Every Day User      E-Cigarette/Vaping Substances    Nicotine Yes     THC No     CBD Yes     Flavoring Yes     Other No     Unknown No      Social History     Tobacco Use    Smoking status: Never Smoker    Smokeless tobacco: Never Used   Substance Use Topics    Alcohol use: Yes     Frequency: Monthly or less     Drinks per session: 1 or 2     Binge frequency: Never     Comment: socially    Drug use: Yes     Types: Marijuana     Comment: 2-3x a week        Review of Systems   All other systems reviewed and are negative  Physical Exam  Physical Exam  Constitutional:       General: She is not in acute distress  Appearance: Normal appearance  She is well-developed  She is not ill-appearing, toxic-appearing or diaphoretic  HENT:      Head: Normocephalic and atraumatic  Right Ear: External ear normal       Left Ear: External ear normal       Nose: Nose normal       Mouth/Throat:      Mouth: Mucous membranes are moist    Eyes:      General:         Right eye: No discharge  Left eye: No discharge  Pupils: Pupils are equal, round, and reactive to light  Neck:      Musculoskeletal: Normal range of motion and neck supple  Vascular: No JVD  Cardiovascular:      Rate and Rhythm: Normal rate and regular rhythm  Pulses: Normal pulses  Heart sounds: Normal heart sounds  No murmur  No friction rub  No gallop  Pulmonary:      Effort: Pulmonary effort is normal  No respiratory distress  Breath sounds: Normal breath sounds  No stridor  No wheezing, rhonchi or rales  Chest:      Chest wall: No tenderness  Abdominal:      General: Abdomen is flat  Bowel sounds are normal  There is no distension  Palpations: Abdomen is soft  There is no mass  Tenderness: There is no abdominal tenderness  There is no guarding or rebound  Hernia: No hernia is present  Musculoskeletal: Normal range of motion           General: No swelling, tenderness, deformity or signs of injury  Right lower leg: No edema  Left lower leg: No edema  Skin:     General: Skin is warm  Capillary Refill: Capillary refill takes less than 2 seconds  Coloration: Skin is not jaundiced or pale  Findings: No bruising, erythema, lesion or rash  Neurological:      General: No focal deficit present  Mental Status: She is alert and oriented to person, place, and time  Cranial Nerves: No cranial nerve deficit  Sensory: No sensory deficit  Motor: No weakness or abnormal muscle tone        Coordination: Coordination normal       Gait: Gait normal       Deep Tendon Reflexes: Reflexes normal    Psychiatric:         Mood and Affect: Mood normal          Vital Signs  ED Triage Vitals   Temp Pulse Respirations Blood Pressure SpO2   -- 03/23/21 0024 03/23/21 0024 03/23/21 0024 03/23/21 0024    88 16 (!) 120/49 100 %      Temp src Heart Rate Source Patient Position - Orthostatic VS BP Location FiO2 (%)   -- 03/23/21 0024 03/23/21 0024 03/23/21 0024 --    Monitor Lying Left arm       Pain Score       03/22/21 2200       No Pain           Vitals:    03/23/21 0024 03/23/21 0242   BP: (!) 120/49 119/61   Pulse: 88 91   Patient Position - Orthostatic VS: Lying Lying         Visual Acuity      ED Medications  Medications   ondansetron (ZOFRAN) injection 4 mg (4 mg Intravenous Given 3/22/21 2225)   sodium chloride 0 9 % bolus 1,000 mL (0 mL Intravenous Stopped 3/22/21 2340)   sodium chloride 0 9 % bolus 1,000 mL (0 mL Intravenous Stopped 3/23/21 0123)   sodium chloride 0 9 % bolus 1,000 mL (0 mL Intravenous Stopped 3/23/21 0243)       Diagnostic Studies  Results Reviewed     Procedure Component Value Units Date/Time    Urine Microscopic [[id number]]  (Abnormal) Collected: 03/23/21 0122    Lab Status: Final result Specimen: Urine, Clean Catch Updated: 03/23/21 0220     RBC, UA 0-1 /hpf      WBC, UA 0-5 /hpf      Epithelial Cells Innumerable /hpf Bacteria, UA Moderate /hpf      MUCUS THREADS Moderate    UA w Reflex to Microscopic w Reflex to Culture [[id number]]  (Abnormal) Collected: 03/23/21 0122    Lab Status: Final result Specimen: Urine, Clean Catch Updated: 03/23/21 0131     Color, UA Yellow     Clarity, UA Slightly Cloudy     Specific Gravity, UA >=1 030     pH, UA 6 0     Leukocytes, UA Negative     Nitrite, UA Negative     Protein, UA 1+ mg/dl      Glucose, UA Negative mg/dl      Ketones, UA 80 (3+) mg/dl      Urobilinogen, UA 0 2 E U /dl      Bilirubin, UA Negative     Blood, UA Negative    COVID19, Influenza A/B, RSV PCR, [facility] [[id number]]  (Normal) Collected: 03/22/21 2340    Lab Status: Final result Specimen: Nares from Nasopharyngeal Swab Updated: 03/23/21 0028     SARS-CoV-2 Negative     INFLUENZA A PCR Negative     INFLUENZA B PCR Negative     RSV PCR Negative    Narrative: This test has been authorized by FDA under an EUA (Emergency Use Assay) for use by authorized laboratories  Clinical caution and judgement should be used with the interpretation of these results with consideration of the clinical impression and other laboratory testing  Testing reported as "Positive" or "Negative" has been proven to be accurate according to standard laboratory validation requirements  All testing is performed with control materials showing appropriate reactivity at standard intervals      hCG, qualitative pregnancy [[id number]]  (Normal) Collected: 03/22/21 2223    Lab Status: Final result Specimen: Blood from Arm, Left Updated: 03/22/21 2341     Preg, Serum Negative    Comprehensive metabolic panel [[id number]]  (Abnormal) Collected: 03/22/21 2223    Lab Status: Final result Specimen: Blood from Arm, Left Updated: 03/22/21 2317     Sodium 137 mmol/L      Potassium 3 9 mmol/L      Chloride 103 mmol/L      CO2 17 mmol/L      ANION GAP 17 mmol/L      BUN 16 mg/dL      Creatinine 0 62 mg/dL      Glucose 82 mg/dL      Calcium 9 8 mg/dL      AST 58 U/L ALT 67 U/L      Alkaline Phosphatase 37 U/L      Total Protein 7 7 g/dL      Albumin 4 8 g/dL      Total Bilirubin 1 00 mg/dL      eGFR 128 ml/min/1 73sq m     Narrative:      [place] guidelines for Chronic Kidney Disease (CKD):     Stage 1 with normal or high GFR (GFR > 90 mL/min/1 73 square meters)    Stage 2 Mild CKD (GFR = 60-89 mL/min/1 73 square meters)    Stage 3A Moderate CKD (GFR = 45-59 mL/min/1 73 square meters)    Stage 3B Moderate CKD (GFR = 30-44 mL/min/1 73 square meters)    Stage 4 Severe CKD (GFR = 15-29 mL/min/1 73 square meters)    Stage 5 End Stage CKD (GFR <15 mL/min/1 73 square meters)  Note: GFR calculation is accurate only with a steady state creatinine    Protime-INR [[id number]]  (Normal) Collected: 03/22/21 2223    Lab Status: Final result Specimen: Blood from Arm, Left Updated: 03/22/21 2241     Protime 14 1 seconds      INR 1 10    APTT [[id number]]  (Normal) Collected: 03/22/21 2223    Lab Status: Final result Specimen: Blood from Arm, Left Updated: 03/22/21 2241     PTT 29 seconds     CBC and differential [[id number]]  (Abnormal) Collected: 03/22/21 2223    Lab Status: Final result Specimen: Blood from Arm, Left Updated: 03/22/21 2233     WBC 16 60 Thousand/uL      RBC 4 49 Million/uL      Hemoglobin 13 7 g/dL      Hematocrit 41 4 %      MCV 92 fL      MCH 30 6 pg      MCHC 33 2 g/dL      RDW 12 8 %      MPV 8 7 fL      Platelets 698 Thousands/uL      Neutrophils Relative 89 %      Lymphocytes Relative 8 %      Monocytes Relative 2 %      Eosinophils Relative 0 %      Basophils Relative 0 %      Neutrophils Absolute 14 80 Thousands/µL      Lymphocytes Absolute 1 40 Thousands/µL      Monocytes Absolute 0 40 Thousand/µL      Eosinophils Absolute 0 00 Thousand/µL      Basophils Absolute 0 10 Thousands/µL                  CT abdomen pelvis wo contrast   Final Result by Angie Hunter MD (03/23 0024)      No acute findings identified in the abdomen or pelvis on this noncontrast study  Workstation performed: [id number]                    Procedures  Procedures         ED Course                                           MDM  Number of Diagnoses or Management Options  Diagnosis management comments: With vomiting for the last 24 hours and unable darci tolerate po  No bld in vomit and no abd pain  In er she has soft abd that is NT  Ct in the face of her leukocytosis and this is neg  Sp 2 l ivf she still with significant ketones in urine and I have given a third L  overall she feels much better  She is ambulating well  Making urine  She has drank 2 water bottles in er and held them down  will plan to dc home after third L  She will fu with pcp/ urgent care or er with sx continuation tomorrow  I have addressed all red flags, need for fu and when to return ter and she has voiced understanding  Disposition  Final diagnoses:   Nausea & vomiting   Dehydration     Time reflects when diagnosis was documented in both MDM as applicable and the Disposition within this note     Time User Action Codes Description Comment    3/23/2021  1:43 AM Sav Ham Add [R11 2] Nausea & vomiting     3/23/2021  1:43 AM Sav Ham Add [E86 0] Dehydration       ED Disposition     ED Disposition Condition Date/Time Comment    Discharge Stable Tue Mar 23, 2021  2:03 AM Radha Wallace discharge to home/self care              Follow-up Information     Follow up With Specialties Details Why [address] [facility], DO Family Medicine Schedule an appointment as soon as possible for a visit in 3 days  [address] [facility]  [address]  [address] [facility] [address]  [phone]            Discharge Medication List as of 3/23/2021  2:03 AM      START taking these medications    Details   ondansetron (ZOFRAN) 4 mg tablet Take 1 tablet (4 mg total) by mouth every 6 (six) hours, Starting Tue 3/23/2021, Normal         CONTINUE these medications which have NOT CHANGED    Details   QUEtiapine (SEROquel) 100 mg tablet Take 100 mg by mouth daily at bedtime, Historical Med      sertraline (ZOLOFT) 100 mg tablet Take 100 mg by mouth daily, Historical Med      albuterol (PROVENTIL HFA,VENTOLIN HFA) 90 mcg/act inhaler Inhale 2 puffs every 4 (four) hours as needed for wheezing, Starting Sat 9/19/2020, Until Sun 9/19/2021, Normal      cholecalciferol (VITAMIN D3) 400 units tablet Take by mouth daily, Historical Med      dexamethasone (DECADRON) 2 mg tablet Take with food  Take one time dose of 10mg by mouth tomorrow, Normal      DM-Doxylamine-Acetaminophen (NYQUIL COLD & FLU PO) Take by mouth, Historical Med      promethazine (PHENERGAN) 25 mg tablet Take 1 tablet (25 mg total) by mouth every 6 (six) hours as needed for nausea or vomiting, Starting Sat 9/19/2020, Normal           No discharge procedures on file      PDMP Review     None          ED Provider  Electronically Signed by           Herb Andrews MD  03/23/21 4867

## 2021-03-23 NOTE — DISCHARGE INSTRUCTIONS
Return to er with continuation of your symptoms, fever at any time or abdominal  pain  See pcp this week for follow up

## 2021-03-31 DIAGNOSIS — Z23 ENCOUNTER FOR IMMUNIZATION: ICD-10-CM

## 2021-04-02 ENCOUNTER — IMMUNIZATIONS (OUTPATIENT)
Dept: FAMILY MEDICINE CLINIC | Facility: HOSPITAL | Age: 21
End: 2021-04-02

## 2021-04-02 DIAGNOSIS — Z23 ENCOUNTER FOR IMMUNIZATION: Primary | ICD-10-CM

## 2021-04-02 PROCEDURE — 91301 SARS-COV-2 / COVID-19 MRNA VACCINE (MODERNA) 100 MCG: CPT

## 2021-04-02 PROCEDURE — 0011A SARS-COV-2 / COVID-19 MRNA VACCINE (MODERNA) 100 MCG: CPT

## 2021-05-04 ENCOUNTER — IMMUNIZATIONS (OUTPATIENT)
Dept: FAMILY MEDICINE CLINIC | Facility: HOSPITAL | Age: 21
End: 2021-05-04

## 2021-05-04 DIAGNOSIS — Z23 ENCOUNTER FOR IMMUNIZATION: Primary | ICD-10-CM

## 2021-05-04 PROCEDURE — 0012A SARS-COV-2 / COVID-19 MRNA VACCINE (MODERNA) 100 MCG: CPT

## 2021-05-04 PROCEDURE — 91301 SARS-COV-2 / COVID-19 MRNA VACCINE (MODERNA) 100 MCG: CPT

## 2021-07-08 ENCOUNTER — HOSPITAL ENCOUNTER (EMERGENCY)
Facility: HOSPITAL | Age: 21
Discharge: HOME | End: 2021-07-08
Attending: EMERGENCY MEDICINE | Admitting: EMERGENCY MEDICINE
Payer: COMMERCIAL

## 2021-07-08 VITALS
HEART RATE: 72 BPM | OXYGEN SATURATION: 100 % | SYSTOLIC BLOOD PRESSURE: 115 MMHG | DIASTOLIC BLOOD PRESSURE: 58 MMHG | TEMPERATURE: 97.5 F | HEIGHT: 62 IN | BODY MASS INDEX: 15.46 KG/M2 | WEIGHT: 84 LBS | RESPIRATION RATE: 20 BRPM

## 2021-07-08 DIAGNOSIS — R11.0 NAUSEA: ICD-10-CM

## 2021-07-08 DIAGNOSIS — T78.40XA ACUTE ALLERGIC REACTION, INITIAL ENCOUNTER: Primary | ICD-10-CM

## 2021-07-08 DIAGNOSIS — N39.0 UTI (URINARY TRACT INFECTION): ICD-10-CM

## 2021-07-08 LAB
BACTERIA UR QL AUTO: ABNORMAL /HPF
BILIRUB UR QL STRIP: NEGATIVE
CLARITY UR: ABNORMAL
COLOR UR: YELLOW
EXT PREG TEST URINE: NEGATIVE
EXT. CONTROL ED NAV: NORMAL
GLUCOSE UR STRIP-MCNC: NEGATIVE MG/DL
HGB UR QL STRIP.AUTO: ABNORMAL
KETONES UR STRIP-MCNC: ABNORMAL MG/DL
LEUKOCYTE ESTERASE UR QL STRIP: ABNORMAL
NITRITE UR QL STRIP: POSITIVE
NON-SQ EPI CELLS URNS QL MICRO: ABNORMAL /HPF
PH UR STRIP.AUTO: 6 [PH]
PROT UR STRIP-MCNC: NEGATIVE MG/DL
RBC #/AREA URNS AUTO: ABNORMAL /HPF
SP GR UR STRIP.AUTO: 1.02 (ref 1–1.03)
UROBILINOGEN UR QL STRIP.AUTO: 0.2 E.U./DL
WBC #/AREA URNS AUTO: ABNORMAL /HPF

## 2021-07-08 PROCEDURE — 87086 URINE CULTURE/COLONY COUNT: CPT | Performed by: EMERGENCY MEDICINE

## 2021-07-08 PROCEDURE — 87077 CULTURE AEROBIC IDENTIFY: CPT | Performed by: EMERGENCY MEDICINE

## 2021-07-08 PROCEDURE — 99285 EMERGENCY DEPT VISIT HI MDM: CPT | Performed by: EMERGENCY MEDICINE

## 2021-07-08 PROCEDURE — 81001 URINALYSIS AUTO W/SCOPE: CPT | Performed by: EMERGENCY MEDICINE

## 2021-07-08 PROCEDURE — 96374 THER/PROPH/DIAG INJ IV PUSH: CPT

## 2021-07-08 PROCEDURE — 81025 URINE PREGNANCY TEST: CPT | Performed by: EMERGENCY MEDICINE

## 2021-07-08 PROCEDURE — 96375 TX/PRO/DX INJ NEW DRUG ADDON: CPT

## 2021-07-08 PROCEDURE — 87186 SC STD MICRODIL/AGAR DIL: CPT | Performed by: EMERGENCY MEDICINE

## 2021-07-08 PROCEDURE — 99283 EMERGENCY DEPT VISIT LOW MDM: CPT

## 2021-07-08 RX ORDER — ONDANSETRON 4 MG/1
4 TABLET, ORALLY DISINTEGRATING ORAL EVERY 6 HOURS PRN
Qty: 12 TABLET | Refills: 0 | Status: SHIPPED | OUTPATIENT
Start: 2021-07-08 | End: 2022-07-08 | Stop reason: ALTCHOICE

## 2021-07-08 RX ORDER — PREDNISONE 20 MG/1
40 TABLET ORAL DAILY
Qty: 8 TABLET | Refills: 0 | Status: SHIPPED | OUTPATIENT
Start: 2021-07-08 | End: 2021-07-12

## 2021-07-08 RX ORDER — LAMOTRIGINE 25 MG/1
25 TABLET ORAL DAILY
COMMUNITY
End: 2022-06-08

## 2021-07-08 RX ORDER — CEPHALEXIN 500 MG/1
500 CAPSULE ORAL EVERY 8 HOURS SCHEDULED
Qty: 21 CAPSULE | Refills: 0 | Status: SHIPPED | OUTPATIENT
Start: 2021-07-08 | End: 2021-07-15

## 2021-07-08 RX ORDER — DIPHENHYDRAMINE HYDROCHLORIDE 50 MG/ML
50 INJECTION INTRAMUSCULAR; INTRAVENOUS ONCE
Status: COMPLETED | OUTPATIENT
Start: 2021-07-08 | End: 2021-07-08

## 2021-07-08 RX ORDER — CEPHALEXIN 500 MG/1
500 CAPSULE ORAL ONCE
Status: COMPLETED | OUTPATIENT
Start: 2021-07-08 | End: 2021-07-08

## 2021-07-08 RX ORDER — METHYLPREDNISOLONE SODIUM SUCCINATE 125 MG/2ML
80 INJECTION, POWDER, LYOPHILIZED, FOR SOLUTION INTRAMUSCULAR; INTRAVENOUS ONCE
Status: COMPLETED | OUTPATIENT
Start: 2021-07-08 | End: 2021-07-08

## 2021-07-08 RX ADMIN — CEPHALEXIN 500 MG: 500 CAPSULE ORAL at 14:02

## 2021-07-08 RX ADMIN — FAMOTIDINE 20 MG: 10 INJECTION INTRAVENOUS at 12:31

## 2021-07-08 RX ADMIN — DIPHENHYDRAMINE HYDROCHLORIDE 50 MG: 50 INJECTION INTRAMUSCULAR; INTRAVENOUS at 12:32

## 2021-07-08 RX ADMIN — METHYLPREDNISOLONE SODIUM SUCCINATE 80 MG: 125 INJECTION, POWDER, FOR SOLUTION INTRAMUSCULAR; INTRAVENOUS at 12:30

## 2021-07-08 NOTE — ED PROVIDER NOTES
History  Chief Complaint   Patient presents with    Allergic Reaction     hives all over body, recently started taking lamictal approx 1-2 wks ago, no other changes      18-year-old female presenting with whole body hives which she states she noticed around 5:00 a m  this morning  She states when she went to sleep last night she had no noted rash  Patient denies any new lotions, detergents, soaps, home exposures  Patient does state however that she was recently started on Lamictal approximately 1-2 weeks ago  She states that prior to that she was on Seroquel and had a similar reaction although she states the Seroquel reaction was not quite is diffuse  She describes the rash as very itchy and warm  Denies any throat swelling, difficulty breathing, vaginal or rectal pain, nausea or vomiting  Patient denies any recent illness, fevers, night sweats, chills, sore throat, cough, chest pain, shortness of breath, abdominal pain, vomiting, diarrhea or constipation dysuria, hematuria  Prior to Admission Medications   Prescriptions Last Dose Informant Patient Reported? Taking? DM-Doxylamine-Acetaminophen (NYQUIL COLD & FLU PO)   Yes No   Sig: Take by mouth   QUEtiapine (SEROquel) 100 mg tablet Not Taking at Unknown time  Yes No   Sig: Take 100 mg by mouth daily at bedtime   Patient not taking: Reported on 7/8/2021   albuterol (PROVENTIL HFA,VENTOLIN HFA) 90 mcg/act inhaler   No No   Sig: Inhale 2 puffs every 4 (four) hours as needed for wheezing   Patient not taking: Reported on 3/22/2021   cholecalciferol (VITAMIN D3) 400 units tablet  Self Yes No   Sig: Take by mouth daily   dexamethasone (DECADRON) 2 mg tablet   No No   Sig: Take with food   Take one time dose of 10mg by mouth tomorrow   Patient not taking: Reported on 3/22/2021   lamoTRIgine (LaMICtal) 25 mg tablet   Yes Yes   Sig: Take 25 mg by mouth daily   ondansetron (ZOFRAN) 4 mg tablet   No No   Sig: Take 1 tablet (4 mg total) by mouth every 6 (six) hours   promethazine (PHENERGAN) 25 mg tablet   No No   Sig: Take 1 tablet (25 mg total) by mouth every 6 (six) hours as needed for nausea or vomiting   Patient not taking: Reported on 3/22/2021   sertraline (ZOLOFT) 100 mg tablet  Self Yes No   Sig: Take 100 mg by mouth daily      Facility-Administered Medications: None       Past Medical History:   Diagnosis Date    Anxiety     Asthma     Depression     Substance abuse ([facility] [address] )        History reviewed  No pertinent surgical history  History reviewed  No pertinent family history  I have reviewed and agree with the history as documented  E-Cigarette/Vaping    E-Cigarette Use Current Every Day User      E-Cigarette/Vaping Substances    Nicotine Yes     THC No     CBD Yes     Flavoring Yes     Other No     Unknown No      Social History     Tobacco Use    Smoking status: Never Smoker    Smokeless tobacco: Never Used   Vaping Use    Vaping Use: Every day    Substances: Nicotine, CBD, Flavoring   Substance Use Topics    Alcohol use: Yes     Comment: socially    Drug use: Yes     Types: Marijuana     Comment: 2-3x a week        Review of Systems   Skin: Positive for rash (Whole body)  All other systems reviewed and are negative  Physical Exam  Physical Exam  Vitals and nursing note reviewed  Constitutional:       General: She is not in acute distress  Appearance: She is well-developed  She is not diaphoretic  HENT:      Head: Normocephalic and atraumatic  Right Ear: External ear normal       Left Ear: External ear normal       Nose: Nose normal       Mouth/Throat:      Mouth: Mucous membranes are moist       Pharynx: Oropharynx is clear  No oropharyngeal exudate or posterior oropharyngeal erythema  Comments: No mucosal sloughing or swelling noted in the oropharynx  Tongue without swelling  No stridor noted  Eyes:      General: No scleral icterus  Right eye: No discharge  Left eye: No discharge  Conjunctiva/sclera: Conjunctivae normal       Pupils: Pupils are equal, round, and reactive to light  Comments: No conjunctival injection   Neck:      Vascular: No JVD  Trachea: No tracheal deviation  Cardiovascular:      Rate and Rhythm: Normal rate and regular rhythm  Heart sounds: Normal heart sounds  No murmur heard  No friction rub  No gallop  Pulmonary:      Effort: Pulmonary effort is normal  No respiratory distress  Breath sounds: Normal breath sounds  No stridor  No wheezing, rhonchi or rales  Abdominal:      General: Bowel sounds are normal  There is no distension  Palpations: Abdomen is soft  There is no mass  Tenderness: There is no abdominal tenderness  There is no guarding  Musculoskeletal:         General: No tenderness or deformity  Normal range of motion  Cervical back: Normal range of motion and neck supple  Skin:     General: Skin is warm and dry  Coloration: Skin is not pale  Findings: Rash present  Rash is urticarial (Diffuse urticarial rash on abdomen, chest, back, face  More confluent and diffuse on the 4 extremities  )  Comments: Patient with diffuse urticarial rash more severe on the 4 extremities  However does have some urticaria on the face, neck, back, chest, abdomen  Rashes Nikolsky is negative  No obvious mucosal involvement noted of the oropharynx  Patient denies any vaginal or rectal pain  Neurological:      Mental Status: She is alert and oriented to person, place, and time  Cranial Nerves: No cranial nerve deficit  Sensory: No sensory deficit  Motor: No abnormal muscle tone  Psychiatric:         Behavior: Behavior normal          Thought Content:  Thought content normal          Judgment: Judgment normal          Vital Signs  ED Triage Vitals [07/08/21 1156]   Temperature Pulse Respirations Blood Pressure SpO2   97 5 °F (36 4 °C) 72 20 115/58 100 %      Temp src Heart Rate Source Patient Position - Orthostatic VS BP Location FiO2 (%)   -- -- -- -- --      Pain Score       --           Vitals:    07/08/21 1156   BP: 115/58   Pulse: 72         Visual Acuity      ED Medications  Medications   cephalexin (KEFLEX) capsule 500 mg (has no administration in time range)   diphenhydrAMINE (BENADRYL) injection 50 mg (50 mg Intravenous Given 7/8/21 1232)   methylPREDNISolone sodium succinate (Solu-MEDROL) injection 80 mg (80 mg Intravenous Given 7/8/21 1230)   famotidine (PEPCID) injection 20 mg (20 mg Intravenous Given 7/8/21 1231)       Diagnostic Studies  Results Reviewed     Procedure Component Value Units Date/Time    Urine Microscopic [[id number]]  (Abnormal) Collected: 07/08/21 1329    Lab Status: Final result Specimen: Urine, Clean Catch Updated: 07/08/21 1347     RBC, UA 1-2 /hpf      WBC, UA 10-20 /hpf      Epithelial Cells Occasional /hpf      Bacteria, UA Moderate /hpf     Urine culture [[id number]] Collected: 07/08/21 1329    Lab Status:  In process Specimen: Urine, Clean Catch Updated: 07/08/21 1347    UA w Reflex to Microscopic w Reflex to Culture [[id number]]  (Abnormal) Collected: 07/08/21 1329    Lab Status: Final result Specimen: Urine, Clean Catch Updated: 07/08/21 1345     Color, UA Yellow     Clarity, UA Hazy     Specific Coats, UA 1 025     pH, UA 6 0     Leukocytes, UA Trace     Nitrite, UA Positive     Protein, UA Negative mg/dl      Glucose, UA Negative mg/dl      Ketones, UA 15 (1+) mg/dl      Urobilinogen, UA 0 2 E U /dl      Bilirubin, UA Negative     Blood, UA 2+    POCT pregnancy, urine [[id number]]  (Normal) Resulted: 07/08/21 1326    Lab Status: Final result Updated: 07/08/21 1326     EXT PREG TEST UR (Ref: Negative) Negative     Control Valid                 No orders to display              Procedures  Procedures         ED Course  ED Course as of Jul 08 1402   Thu Jul 08, 2021   1209 Dr Alex Murray office and spoke with office staff who is going to have one of their practitioners call me      [phone] office and directed to [facility] office [phone]  Spoke with office staff who is trying to find a provider for me to speak to as Patient's Analilia Baeza is not working today  [id number] with [facility], has appt with Antonino Kumari this coming Monday 7/12/21  He states patient should stop lamictal and should not have an issue with stopping it and can follow with Antonino Wallisg on Monday  1348 Nitrite, UA(!): Positive   1348 Bacteria, UA(!): Moderate   1357 Rash improved at this time, patient states itchiness also very improved currently  MDM  Number of Diagnoses or Management Options  Acute allergic reaction, initial encounter  Nausea  UTI (urinary tract infection)  Diagnosis management comments: Appears to be drug rash, will treat with Benadryl, Solu-Medrol, Pepcid     Patient with no other symptoms other than itching rash  Reach out to psychiatry office that is managing her psychiatric medications  Discussed case with patient's psychiatric office he states she should stop Lamictal this time that she should not have withdrawal symptoms  Patient is the follow-up appoint with them in 4 days  Patient given prednisone at home, Keflex for urinary tract infection and discussed with patient that she has urinary tract infection, also given Zofran as she has been having intermittent nausea  Strict return precautions provided  Patient and her significant other in the room state understanding of this        Disposition  Final diagnoses:   Acute allergic reaction, initial encounter   UTI (urinary tract infection)   Nausea     Time reflects when diagnosis was documented in both MDM as applicable and the Disposition within this note     Time User Action Codes Description Comment    7/8/2021  1:54 PM Eliezer Us Add [T78 40XA] Acute allergic reaction, initial encounter     7/8/2021  1:54 PM Jaz Thong Sheppard Add [N39 0] UTI (urinary tract infection)     7/8/2021  1:55 PM Leesburgedgar Wood Add [R11 0] Nausea       ED Disposition     ED Disposition Condition Date/Time Comment    Discharge Stable Thu Jul 8, 2021  1:54 PM Mamadou Ramesh discharge to home/self care  Follow-up Information     Follow up With Specialties Details Why Contact Info Additional [address] your Psychiatry NP         Thony Perez  Emergency Department Emergency Medicine Go to  As needed, If symptoms worsen [place] [phone]  [phone] Thony Perez  Emergency Department          Patient's Medications   Discharge Prescriptions    CEPHALEXIN (KEFLEX) 500 MG CAPSULE    Take 1 capsule (500 mg total) by mouth every 8 (eight) hours for 7 days       Start Date: 7/8/2021  End Date: 7/15/2021       Order Dose: 500 mg       Quantity: 21 capsule    Refills: 0    ONDANSETRON (ZOFRAN-ODT) 4 MG DISINTEGRATING TABLET    Take 1 tablet (4 mg total) by mouth every 6 (six) hours as needed for nausea or vomiting for up to 3 days       Start Date: 7/8/2021  End Date: 7/11/2021       Order Dose: 4 mg       Quantity: 12 tablet    Refills: 0    PREDNISONE 20 MG TABLET    Take 2 tablets (40 mg total) by mouth daily for 4 days       Start Date: 7/8/2021  End Date: 7/12/2021       Order Dose: 40 mg       Quantity: 8 tablet    Refills: 0     No discharge procedures on file      PDMP Review     None          ED Provider  Electronically Signed by           Price Motta DO  07/08/21 4724

## 2021-07-10 LAB — BACTERIA UR CULT: ABNORMAL

## 2021-10-07 ENCOUNTER — OFFICE VISIT (OUTPATIENT)
Dept: URGENT CARE | Facility: CLINIC | Age: 21
End: 2021-10-07
Payer: COMMERCIAL

## 2021-10-07 VITALS
HEIGHT: 62 IN | SYSTOLIC BLOOD PRESSURE: 114 MMHG | OXYGEN SATURATION: 100 % | BODY MASS INDEX: 16.27 KG/M2 | DIASTOLIC BLOOD PRESSURE: 58 MMHG | RESPIRATION RATE: 18 BRPM | WEIGHT: 88.4 LBS | HEART RATE: 64 BPM | TEMPERATURE: 98.5 F

## 2021-10-07 DIAGNOSIS — K04.7 DENTAL INFECTION: Primary | ICD-10-CM

## 2021-10-07 PROCEDURE — 99213 OFFICE O/P EST LOW 20 MIN: CPT | Performed by: NURSE PRACTITIONER

## 2021-10-07 RX ORDER — TOPIRAMATE 25 MG/1
25 TABLET ORAL DAILY
COMMUNITY
Start: 2021-07-12 | End: 2022-07-08 | Stop reason: DRUGHIGH

## 2021-10-07 RX ORDER — PAROXETINE 30 MG/1
30 TABLET, FILM COATED ORAL EVERY MORNING
COMMUNITY
Start: 2021-09-19 | End: 2022-07-08 | Stop reason: DRUGHIGH

## 2021-10-07 RX ORDER — TOPIRAMATE 50 MG/1
50 TABLET, FILM COATED ORAL DAILY
COMMUNITY
Start: 2021-08-23

## 2021-10-07 RX ORDER — AMOXICILLIN AND CLAVULANATE POTASSIUM 875; 125 MG/1; MG/1
1 TABLET, FILM COATED ORAL EVERY 12 HOURS SCHEDULED
Qty: 14 TABLET | Refills: 0 | Status: SHIPPED | OUTPATIENT
Start: 2021-10-07 | End: 2021-10-14

## 2021-10-24 ENCOUNTER — HOSPITAL ENCOUNTER (EMERGENCY)
Facility: HOSPITAL | Age: 21
Discharge: HOME | End: 2021-10-24
Attending: EMERGENCY MEDICINE
Payer: COMMERCIAL

## 2021-10-24 VITALS
BODY MASS INDEX: 16.1 KG/M2 | DIASTOLIC BLOOD PRESSURE: 58 MMHG | WEIGHT: 88 LBS | SYSTOLIC BLOOD PRESSURE: 116 MMHG | TEMPERATURE: 99.4 F | HEART RATE: 93 BPM | RESPIRATION RATE: 15 BRPM | OXYGEN SATURATION: 100 %

## 2021-10-24 DIAGNOSIS — R21 RASH AND NONSPECIFIC SKIN ERUPTION: Primary | ICD-10-CM

## 2021-10-24 PROCEDURE — 99282 EMERGENCY DEPT VISIT SF MDM: CPT

## 2021-10-24 PROCEDURE — 99284 EMERGENCY DEPT VISIT MOD MDM: CPT | Performed by: EMERGENCY MEDICINE

## 2021-10-24 RX ORDER — PREDNISONE 20 MG/1
40 TABLET ORAL ONCE
Status: COMPLETED | OUTPATIENT
Start: 2021-10-24 | End: 2021-10-24

## 2021-10-24 RX ORDER — PREDNISONE 20 MG/1
20 TABLET ORAL 2 TIMES DAILY WITH MEALS
Qty: 10 TABLET | Refills: 0 | Status: SHIPPED | OUTPATIENT
Start: 2021-10-24 | End: 2021-10-29

## 2021-10-24 RX ORDER — DIPHENHYDRAMINE HCL 25 MG
25 TABLET ORAL ONCE
Status: COMPLETED | OUTPATIENT
Start: 2021-10-24 | End: 2021-10-24

## 2021-10-24 RX ADMIN — PREDNISONE 40 MG: 20 TABLET ORAL at 17:44

## 2021-10-24 RX ADMIN — DIPHENHYDRAMINE HCL 25 MG: 25 TABLET ORAL at 17:44

## 2022-03-07 ENCOUNTER — OFFICE VISIT (OUTPATIENT)
Dept: DERMATOLOGY | Facility: CLINIC | Age: 22
End: 2022-03-07
Payer: COMMERCIAL

## 2022-03-07 VITALS — TEMPERATURE: 98.7 F | HEIGHT: 62 IN | WEIGHT: 84 LBS | BODY MASS INDEX: 15.46 KG/M2

## 2022-03-07 DIAGNOSIS — R21 RASH AND NONSPECIFIC SKIN ERUPTION: ICD-10-CM

## 2022-03-07 DIAGNOSIS — L30.8 AUTOIMMUNE PROGESTERONE DERMATITIS/URTICARIA: Primary | ICD-10-CM

## 2022-03-07 PROCEDURE — 99203 OFFICE O/P NEW LOW 30 MIN: CPT | Performed by: DERMATOLOGY

## 2022-03-07 RX ORDER — PAROXETINE HYDROCHLORIDE 40 MG/1
40 TABLET, FILM COATED ORAL EVERY MORNING
COMMUNITY
Start: 2022-01-26

## 2022-03-07 NOTE — PROGRESS NOTES
[facility] DERMATOLOGY  [address] [place] [id number]  [phone]  [phone]     MRN: [id number] : 2000  Encounter: [id number]  Patient Information: Juan Miguel Layman  Chief complaint: hives    History of present illness:  77-year-old female with history of rash that occurs right before her menstrual cycle and during the cycle  Patient notes that this is been going on for about a year completely resolved in between periods  Of note patient has had an IUD for 3 years which is on going to be removed shortly  Patient has tried Claritin with minimal effect  Past Medical History:   Diagnosis Date    Anxiety     Asthma     Depression     Substance abuse ([facility] [address] )      History reviewed  No pertinent surgical history  Social History   Social History     Substance and Sexual Activity   Alcohol Use Yes    Comment: socially     Social History     Substance and Sexual Activity   Drug Use Yes    Types: Marijuana    Comment: 2-3x a week      Social History     Tobacco Use   Smoking Status Never Smoker   Smokeless Tobacco Never Used     History reviewed  No pertinent family history  Meds/Allergies   Allergies   Allergen Reactions    Iodine - Food Allergy Anaphylaxis     Shell fish    Lamictal [Lamotrigine] Hives       Meds:  Prior to Admission medications    Medication Sig Start Date End Date Taking? Authorizing Provider   PARoxetine (PAXIL) 40 MG tablet Take 40 mg by mouth every morning 22  Yes Historical Provider, MD   topiramate (TOPAMAX) 50 MG tablet Take 50 mg by mouth daily 21  Yes Historical Provider, MD   cholecalciferol (VITAMIN D3) 400 units tablet Take by mouth daily  Patient not taking: Reported on 10/7/2021    Historical Provider, MD   dexamethasone (DECADRON) 2 mg tablet Take with food   Take one time dose of 10mg by mouth tomorrow  Patient not taking: Reported on 3/22/2021 9/19/20   Geoffrey Hernandes MD   DM-Doxylamine-Acetaminophen (NYQUIL COLD & FLU PO) Take by mouth  Patient not taking: Reported on 10/7/2021    Historical Provider, MD   lamoTRIgine (LaMICtal) 25 mg tablet Take 25 mg by mouth daily    Historical Provider, MD   ondansetron (ZOFRAN) 4 mg tablet Take 1 tablet (4 mg total) by mouth every 6 (six) hours  Patient not taking: Reported on 10/7/2021 3/23/21   Néstor Rehman MD   ondansetron (ZOFRAN-ODT) 4 mg disintegrating tablet Take 1 tablet (4 mg total) by mouth every 6 (six) hours as needed for nausea or vomiting for up to 3 days 7/8/21 7/11/21  Virginia Sebastian DO   PARoxetine (PAXIL) 30 mg tablet Take 30 mg by mouth every morning  Patient not taking: Reported on 3/7/2022  9/19/21   Historical Provider, MD   promethazine (PHENERGAN) 25 mg tablet Take 1 tablet (25 mg total) by mouth every 6 (six) hours as needed for nausea or vomiting  Patient not taking: Reported on 3/22/2021 9/19/20   Charlie Shin MD   QUEtiapine (SEROquel) 100 mg tablet Take 100 mg by mouth daily at bedtime  Patient not taking: Reported on 7/8/2021    Historical Provider, MD   sertraline (ZOLOFT) 100 mg tablet Take 100 mg by mouth daily  Patient not taking: Reported on 10/7/2021    Historical Provider, MD   topiramate (TOPAMAX) 25 mg tablet Take 25 mg by mouth daily  Patient not taking: Reported on 3/7/2022  7/12/21   Historical Provider, MD       Subjective:     Review of Systems:    General: negative for - chills, fatigue, fever,  weight gain or weight loss  Psychological: negative for - anxiety, behavioral disorder, concentration difficulties, decreased libido, depression, irritability, memory difficulties, mood swings, sleep disturbances or suicidal ideation  ENT: negative for - hearing difficulties , nasal congestion, nasal discharge, oral lesions, sinus pain, sneezing, sore throat  Allergy and Immunology: negative for - hives, insect bite sensitivity,  Hematological and Lymphatic: negative for - bleeding problems, blood clots,bruising, swollen lymph nodes  Endocrine: negative for - hair pattern changes, hot flashes, malaise/lethargy, mood swings, palpitations, polydipsia/polyuria, skin changes, temperature intolerance or unexpected weight change  Respiratory: negative for - cough, hemoptysis, orthopnea, shortness of breath, or wheezing  Cardiovascular: negative for - chest pain, dyspnea on exertion, edema,  Gastrointestinal: negative for - abdominal pain, nausea/vomiting  Genito-Urinary: negative for - dysuria, incontinence, irregular/heavy menses or urinary frequency/urgency  Musculoskeletal: negative for - gait disturbance, joint pain, joint stiffness, joint swelling, muscle pain, muscular weakness  Dermatological:  As in HPI  Neurological: negative for confusion, dizziness, headaches, impaired coordination/balance, memory loss, numbness/tingling, seizures, speech problems, tremors or weakness       Objective:   Temp 98 7 °F (37 1 °C) (Temporal)   Ht 5' 2" (1 575 m)   Wt 38 1 kg (84 lb)   BMI 15 36 kg/m²     Physical Exam:    General Appearance:    Alert, cooperative, no distress   Head:    Normocephalic, without obvious abnormality, atraumatic           Skin:   A full skin exam was performed including scalp, head scalp, eyes, ears, nose, lips, neck, chest, axilla, abdomen, back, buttocks, bilateral upper extremities, bilateral lower extremities, hands, feet, fingers, toes, fingernails, and toenails urticarial erythematous papules noted on widespread areas of her body somewhat almost erythema multiforme like     Assessment:     1  Autoimmune progesterone dermatitis/urticaria     2  Rash and nonspecific skin eruption  Ambulatory referral to Dermatology         Plan:   Advised patient 1  To try increasing the Claritin up to 40 mg daily to see if this will control this process have her also have the IUD removed and go on birth control pills to see if this will help to control this process better    If no improvement with this she can not switch to another nonsedating antihistamine  If no improvement patient to let me know  Nothing of else of concern noted    Edwin Perez MD  1/8/1221,7:06 PM    Portions of the record may have been created with voice recognition software   Occasional wrong word or "sound a like" substitutions may have occurred due to the inherent limitations of voice recognition software   Read the chart carefully and recognize, using context, where substitutions have occurred

## 2022-03-07 NOTE — PATIENT INSTRUCTIONS
Advised patient 1  To try increasing the Claritin up to 40 mg daily to see if this will control this process have her also have the IUD removed and go on birth control pills to see if this will help to control this process better  If no improvement with this she can not switch to another nonsedating antihistamine    If no improvement patient to let me know  Nothing of else of concern noted

## 2022-06-08 ENCOUNTER — OFFICE VISIT (OUTPATIENT)
Dept: FAMILY MEDICINE CLINIC | Facility: CLINIC | Age: 22
End: 2022-06-08
Payer: COMMERCIAL

## 2022-06-08 VITALS
HEART RATE: 68 BPM | RESPIRATION RATE: 16 BRPM | TEMPERATURE: 97.5 F | WEIGHT: 85 LBS | HEIGHT: 62 IN | DIASTOLIC BLOOD PRESSURE: 84 MMHG | SYSTOLIC BLOOD PRESSURE: 126 MMHG | BODY MASS INDEX: 15.64 KG/M2

## 2022-06-08 DIAGNOSIS — F32.A ANXIETY AND DEPRESSION: Primary | ICD-10-CM

## 2022-06-08 DIAGNOSIS — L50.5 CHOLINERGIC URTICARIA: ICD-10-CM

## 2022-06-08 DIAGNOSIS — T50.902D INTENTIONAL DRUG OVERDOSE, SUBSEQUENT ENCOUNTER: ICD-10-CM

## 2022-06-08 DIAGNOSIS — F41.9 ANXIETY AND DEPRESSION: Primary | ICD-10-CM

## 2022-06-08 DIAGNOSIS — R63.6 UNDERWEIGHT: ICD-10-CM

## 2022-06-08 DIAGNOSIS — E55.9 VITAMIN D DEFICIENCY: ICD-10-CM

## 2022-06-08 DIAGNOSIS — F31.70 BIPOLAR AFFECTIVE DISORDER IN REMISSION (HCC): ICD-10-CM

## 2022-06-08 DIAGNOSIS — Z13.220 SCREENING FOR CHOLESTEROL LEVEL: ICD-10-CM

## 2022-06-08 PROCEDURE — 99204 OFFICE O/P NEW MOD 45 MIN: CPT | Performed by: FAMILY MEDICINE

## 2022-06-08 RX ORDER — CETIRIZINE HYDROCHLORIDE 10 MG/1
10 TABLET ORAL DAILY
Qty: 90 TABLET | Refills: 1 | Status: SHIPPED | OUTPATIENT
Start: 2022-06-08

## 2022-06-08 RX ORDER — HYDROXYZINE 50 MG/1
50 TABLET, FILM COATED ORAL
COMMUNITY
Start: 2022-05-18

## 2022-06-08 RX ORDER — FAMOTIDINE 20 MG/1
20 TABLET, FILM COATED ORAL 2 TIMES DAILY
Qty: 180 TABLET | Refills: 1 | Status: SHIPPED | OUTPATIENT
Start: 2022-06-08

## 2022-06-08 NOTE — PROGRESS NOTES
Assessment/Plan:  Patient has severe anxiety and depression with bipolar disorder treated by [facility]  She currently is on Paxil 40 mg daily and hydroxyzine 50 mg at bedtime  Also on Topamax 25 mg daily  The patient is in need of an emotional support cat  The patient has suffered severe depression in the past and had an episode of an intentional drug  overdose    Hives that cycle with her menstrual cycle  The patient had been evaluated by Dermatology the consultation was reviewed and appreciated  The patient at the thigh med and intradermal birth control device and was thought that she was having an autoimmune reaction to progesterone  The device was removed and the hives still cycled  The plan is a trial of Zyrtec 10 mg b i d  And Pepcid 20 mg b i d  During flare-ups  S felt this may be a form of cholinergic urticaria  Problem List Items Addressed This Visit        Other    Intentional drug overdose ([facility] [address] )      Other Visit Diagnoses     Anxiety and depression    -  Primary    Relevant Medications    hydrOXYzine HCL (ATARAX) 50 mg tablet    Bipolar affective disorder in remission (HCC)        Relevant Medications    hydrOXYzine HCL (ATARAX) 50 mg tablet    Cholinergic urticaria        Relevant Medications    cetirizine (ZyrTEC) 10 mg tablet    famotidine (PEPCID) 20 mg tablet    Other Relevant Orders    CBC and differential    Comprehensive metabolic panel    Underweight        Relevant Orders    UA w Reflex to Microscopic w Reflex to Culture    Screening for cholesterol level        Relevant Orders    Lipid Panel with Direct LDL reflex    Vitamin D deficiency        Relevant Orders    Vitamin D 25 hydroxy           Diagnoses and all orders for this visit:    Anxiety and depression    Bipolar affective disorder in remission (HCC)    Cholinergic urticaria  -     CBC and differential; Future  -     Comprehensive metabolic panel; Future  -     cetirizine (ZyrTEC) 10 mg tablet;  Take 1 tablet (10 mg total) by mouth daily  -     famotidine (PEPCID) 20 mg tablet; Take 1 tablet (20 mg total) by mouth 2 (two) times a day    Intentional drug overdose, subsequent encounter    Underweight  -     UA w Reflex to Microscopic w Reflex to Culture    Screening for cholesterol level  -     Lipid Panel with Direct LDL reflex; Future    Vitamin D deficiency  -     Vitamin D 25 hydroxy; Future    Other orders  -     hydrOXYzine HCL (ATARAX) 50 mg tablet; Take 50 mg by mouth daily at bedtime      No problem-specific Assessment & Plan notes found for this encounter  PHQ-2/9 Depression Screening    Little interest or pleasure in doing things: 1 - several days  Feeling down, depressed, or hopeless: 1 - several days  PHQ-2 Score: 2  PHQ-2 Interpretation: Negative depression screen          Body mass index is 15 55 kg/m²  BMI Counseling: Body mass index is 15 55 kg/m²  The BMI is below normal  Patient was advised to gain weight  Subjective:      Patient ID: Winifred Menendez is a 23 y o  female  Patient presents with a chief complaint of needing paperwork completed to have a emotional support cat in her apartment  The patient also complains of hives that begin at the beginning of her menstrual cycle and peak midcycle  The following portions of the patient's history were reviewed and updated as appropriate:   She has a past medical history of Anxiety, Asthma, Depression, and Substance abuse ([address] )  ,  does not have any pertinent problems on file  ,   has no past surgical history on file  ,  family history is not on file  ,   reports that she has never smoked  She has never used smokeless tobacco  She reports current alcohol use  She reports current drug use  Drug: Marijuana  ,  is allergic to iodine - food allergy and lamictal [lamotrigine]     Current Outpatient Medications   Medication Sig Dispense Refill    cetirizine (ZyrTEC) 10 mg tablet Take 1 tablet (10 mg total) by mouth daily 90 tablet 1    famotidine (PEPCID) 20 mg tablet Take 1 tablet (20 mg total) by mouth 2 (two) times a day 180 tablet 1    hydrOXYzine HCL (ATARAX) 50 mg tablet Take 50 mg by mouth daily at bedtime      PARoxetine (PAXIL) 40 MG tablet Take 40 mg by mouth every morning      topiramate (TOPAMAX) 25 mg tablet Take 25 mg by mouth daily      cholecalciferol (VITAMIN D3) 400 units tablet Take by mouth daily (Patient not taking: Reported on 10/7/2021)      dexamethasone (DECADRON) 2 mg tablet Take with food  Take one time dose of 10mg by mouth tomorrow (Patient not taking: Reported on 3/22/2021) 5 tablet 0    ondansetron (ZOFRAN) 4 mg tablet Take 1 tablet (4 mg total) by mouth every 6 (six) hours (Patient not taking: Reported on 10/7/2021) 12 tablet 0    ondansetron (ZOFRAN-ODT) 4 mg disintegrating tablet Take 1 tablet (4 mg total) by mouth every 6 (six) hours as needed for nausea or vomiting for up to 3 days (Patient not taking: Reported on 6/8/2022) 12 tablet 0    PARoxetine (PAXIL) 30 mg tablet Take 30 mg by mouth every morning (Patient not taking: Reported on 3/7/2022 )      promethazine (PHENERGAN) 25 mg tablet Take 1 tablet (25 mg total) by mouth every 6 (six) hours as needed for nausea or vomiting (Patient not taking: Reported on 3/22/2021) 6 tablet 0    QUEtiapine (SEROquel) 100 mg tablet Take 100 mg by mouth daily at bedtime (Patient not taking: Reported on 7/8/2021)      sertraline (ZOLOFT) 100 mg tablet Take 100 mg by mouth daily (Patient not taking: Reported on 10/7/2021)      topiramate (TOPAMAX) 50 MG tablet Take 50 mg by mouth daily (Patient not taking: Reported on 6/8/2022)       No current facility-administered medications for this visit  Review of Systems   Constitutional: Negative for chills and fever  HENT: Negative for ear pain and sore throat  Eyes: Negative for pain and visual disturbance  Respiratory: Negative for cough and shortness of breath  Cardiovascular: Negative for chest pain and palpitations  Gastrointestinal: Negative for abdominal pain and vomiting  Genitourinary: Negative for dysuria and hematuria  Musculoskeletal: Negative for arthralgias and back pain  Skin: Positive for rash  Negative for color change  Develops hives in rhythm with her menstrual cycle   Neurological: Negative for seizures and syncope  Psychiatric/Behavioral: Positive for dysphoric mood  The patient is nervous/anxious  All other systems reviewed and are negative  Objective:    /84   Pulse 68   Temp 97 5 °F (36 4 °C)   Resp 16   Ht 5' 2" (1 575 m)   Wt 38 6 kg (85 lb)   BMI 15 55 kg/m²   Body mass index is 15 55 kg/m²  Physical Exam  Constitutional:       Appearance: She is well-developed  Comments: Underweight   HENT:      Head: Normocephalic  Eyes:      Pupils: Pupils are equal, round, and reactive to light  Cardiovascular:      Rate and Rhythm: Normal rate and regular rhythm  Heart sounds: Normal heart sounds  Pulmonary:      Effort: Pulmonary effort is normal       Breath sounds: Normal breath sounds  Abdominal:      General: Bowel sounds are normal       Palpations: Abdomen is soft  Tenderness: There is no abdominal tenderness  Musculoskeletal:      Cervical back: Normal range of motion  Skin:     General: Skin is warm  Neurological:      Mental Status: She is alert and oriented to person, place, and time

## 2022-06-09 ENCOUNTER — TELEPHONE (OUTPATIENT)
Dept: ADMINISTRATIVE | Facility: OTHER | Age: 22
End: 2022-06-09

## 2022-06-09 NOTE — TELEPHONE ENCOUNTER
----- Message from Angel Luis Jones sent at 6/8/2022  4:07 PM EDT -----  Regarding: Care Gaps Request  06/08/22 4:07 PM    Hello, our patient Adrianna Smallwood has had Pap Smear (HPV) aka Cervical Cancer Screening completed/performed  Please assist in updating the patient chart by making an External outreach to Dr Cady Barger facility located in [place]  The date of service is most recent      Thank you,  Angel Luis Jones  [facility]

## 2022-06-09 NOTE — LETTER
Procedure Request Form: Cervical Cancer Screening      Date Requested: 06/10/22  Patient: Winifred Menendez  Patient : 2000   Referring Provider: Eden Rome, DO        Date of Procedure ______________________________       The above patient has informed us that they have completed their   most recent Cervical Cancer Screening at your facility  Please complete   this form and attach all corresponding procedure reports/results  Comments __________________________________________________________  ____________________________________________________________________  ____________________________________________________________________  ____________________________________________________________________    Facility Completing Procedure _________________________________________    Form Completed By (print name) _______________________________________      Signature __________________________________________________________      These reports are needed for  compliance  Please fax this completed form and a copy of the procedure report to our office located at [place] [address] as soon as possible to [phone] albert Sethi Yajaira: Phone [phone]    We thank you for your assistance in treating our mutual patient

## 2022-06-10 NOTE — TELEPHONE ENCOUNTER
Upon review of the In Basket request and the patient's chart, initial outreach has been made via fax, please see Contacts section for details       Thank you  Edin Garcia

## 2022-06-10 NOTE — TELEPHONE ENCOUNTER
Upon review of the In Basket request we were able to locate, review, and update the patient chart as requested for Pap Smear (HPV) aka Cervical Cancer Screening  Any additional questions or concerns should be emailed to the Practice Liaisons via [email or web address] email, please do not reply via In Basket      Thank you  Sarah Arreaga

## 2022-07-08 ENCOUNTER — OFFICE VISIT (OUTPATIENT)
Dept: FAMILY MEDICINE CLINIC | Facility: CLINIC | Age: 22
End: 2022-07-08
Payer: COMMERCIAL

## 2022-07-08 VITALS
WEIGHT: 83 LBS | RESPIRATION RATE: 20 BRPM | TEMPERATURE: 98.2 F | HEIGHT: 62 IN | SYSTOLIC BLOOD PRESSURE: 126 MMHG | HEART RATE: 84 BPM | DIASTOLIC BLOOD PRESSURE: 84 MMHG | BODY MASS INDEX: 15.27 KG/M2

## 2022-07-08 DIAGNOSIS — Z11.4 SCREENING FOR HIV (HUMAN IMMUNODEFICIENCY VIRUS): ICD-10-CM

## 2022-07-08 DIAGNOSIS — Z13.1 SCREENING FOR DIABETES MELLITUS: ICD-10-CM

## 2022-07-08 DIAGNOSIS — Z11.59 NEED FOR HEPATITIS C SCREENING TEST: ICD-10-CM

## 2022-07-08 DIAGNOSIS — Z00.00 ANNUAL PHYSICAL EXAM: Primary | ICD-10-CM

## 2022-07-08 PROCEDURE — 99395 PREV VISIT EST AGE 18-39: CPT | Performed by: FAMILY MEDICINE

## 2022-07-08 RX ORDER — PAROXETINE 10 MG/1
10 TABLET, FILM COATED ORAL DAILY
COMMUNITY
Start: 2022-07-07

## 2022-07-08 NOTE — PROGRESS NOTES
[address] PRIMARY CARE    NAME: Mykel Em  AGE: 23 y o  SEX: female  : 2000     DATE: 2022     Assessment and Plan:  Underweight patient was advised to gain weight    Anxiety and depression currently treated by Psychiatry     Problem List Items Addressed This Visit    None     Visit Diagnoses     Annual physical exam    -  Primary    Relevant Orders    Glucose, fasting    Lipid panel    HIV 1/2 Antigen/Antibody (4th Generation) w Reflex [facility]    TSH, 3rd generation with Free T4 reflex    Hepatitis C antibody    CBC and Platelet    Screening for diabetes mellitus        Relevant Orders    Glucose, fasting    Screening for HIV (human immunodeficiency virus)        Relevant Orders    HIV 1/2 Antigen/Antibody (4th Generation) w Reflex [facility]    Need for hepatitis C screening test        Relevant Orders    Hepatitis C antibody          Immunizations and preventive care screenings were discussed with patient today  Appropriate education was printed on patient's after visit summary  Counseling:   Alcohol/drug use: discussed moderation in alcohol intake, the recommendations for healthy alcohol use, and avoidance of illicit drug use   Dental Health: discussed importance of regular tooth brushing, flossing, and dental visits   Injury prevention: discussed safety/seat belts, safety helmets, smoke detectors, carbon dioxide detectors, and smoking near bedding or upholstery   Sexual health: discussed sexually transmitted diseases, partner selection, use of condoms, avoidance of unintended pregnancy, and contraceptive alternatives  · Exercise: the importance of regular exercise/physical activity was discussed  Recommend exercise 3-5 times per week for at least 30 minutes  Return in about 6 months (around 2023)       Chief Complaint:     Chief Complaint   Patient presents with    Annual Exam    Follow-up     One month check up       History of Present Illness:     Adult Annual Physical   Patient here for a comprehensive physical exam  The patient reports no problems  Diet and Physical Activity  · Diet/Nutrition: well balanced diet  · Exercise: walking, 1-2 times a week on average and less than 30 minutes on average  Depression Screening  PHQ-2/9 Depression Screening         General Health  · Sleep: sleeps well  · Hearing: normal - bilateral   · Vision: no vision problems  · Dental: no dental visits for >1 year, brushes teeth twice daily and does not floss  /GYN Health  · Last menstrual period: 3weeks ago  · Contraceptive method: oral contraceptives  · History of STDs?: no      Review of Systems:     Review of Systems   Constitutional: Negative for chills and fever  HENT: Negative for ear pain and sore throat  Eyes: Negative for pain and visual disturbance  Respiratory: Negative for cough and shortness of breath  Cardiovascular: Negative for chest pain and palpitations  Gastrointestinal: Negative for abdominal pain and vomiting  Genitourinary: Negative for dysuria and hematuria  Musculoskeletal: Negative for arthralgias and back pain  Skin: Negative for color change and rash  Neurological: Negative for seizures and syncope  All other systems reviewed and are negative  Past Medical History:     Past Medical History:   Diagnosis Date    Anxiety     Asthma     Depression     [facility]       Past Surgical History:     History reviewed  No pertinent surgical history     Social History:     Social History     Socioeconomic History    Marital status: Single     Spouse name: None    Number of children: None    Years of education: None    Highest education level: None   Occupational History    None   Tobacco Use    Smoking status: Never Smoker    Smokeless tobacco: Never Used   Vaping Use    Vaping Use: Every day    Substances: Nicotine, CBD, Flavoring Substance and Sexual Activity    Alcohol use: Yes     Comment: socially    Drug use: Yes     Types: Marijuana     Comment: 2-3x a week     Sexual activity: None   Other Topics Concern    None   Social History Narrative    None     Social Determinants of Health     Financial Resource Strain: Not on file   Food Insecurity: Not on file   Transportation Needs: Not on file   Physical Activity: Not on file   Stress: Not on file   Social Connections: Not on file   Intimate Partner Violence: Not on file   Housing Stability: Not on file      Family History:     History reviewed  No pertinent family history  Current Medications:     Current Outpatient Medications   Medication Sig Dispense Refill    cetirizine (ZyrTEC) 10 mg tablet Take 1 tablet (10 mg total) by mouth daily 90 tablet 1    hydrOXYzine HCL (ATARAX) 50 mg tablet Take 50 mg by mouth daily at bedtime      ondansetron (ZOFRAN) 4 mg tablet Take 1 tablet (4 mg total) by mouth every 6 (six) hours 12 tablet 0    PARoxetine (PAXIL) 10 mg tablet Take 10 mg by mouth in the morning Take 10mg paxil with 40mg to equal 50mg daily      PARoxetine (PAXIL) 40 MG tablet Take 40 mg by mouth every morning      topiramate (TOPAMAX) 50 MG tablet Take 50 mg by mouth daily      famotidine (PEPCID) 20 mg tablet Take 1 tablet (20 mg total) by mouth 2 (two) times a day (Patient not taking: Reported on 7/8/2022) 180 tablet 1     No current facility-administered medications for this visit  Allergies: Allergies   Allergen Reactions    Iodine - Food Allergy Anaphylaxis     Shell fish    Lamictal [Lamotrigine] Hives      Physical Exam:     /84   Pulse 84   Temp 98 2 °F (36 8 °C)   Resp 20   Ht 5' 2" (1 575 m)   Wt 37 6 kg (83 lb)   BMI 15 18 kg/m²     Physical Exam  Vitals and nursing note reviewed  Constitutional:       General: She is not in acute distress  Appearance: She is well-developed        Comments: Underweight   HENT:      Head: Normocephalic and atraumatic  Eyes:      Conjunctiva/sclera: Conjunctivae normal    Cardiovascular:      Rate and Rhythm: Normal rate and regular rhythm  Heart sounds: No murmur heard  Pulmonary:      Effort: Pulmonary effort is normal  No respiratory distress  Breath sounds: Normal breath sounds  Abdominal:      Palpations: Abdomen is soft  Tenderness: There is no abdominal tenderness  Musculoskeletal:      Cervical back: Neck supple  Skin:     General: Skin is warm and dry  Neurological:      Mental Status: She is alert            Korin Beard DO   [facility]

## 2022-07-08 NOTE — PATIENT INSTRUCTIONS

## 2022-07-20 ENCOUNTER — APPOINTMENT (OUTPATIENT)
Dept: LAB | Facility: HOSPITAL | Age: 22
End: 2022-07-20
Attending: FAMILY MEDICINE
Payer: COMMERCIAL

## 2022-07-20 DIAGNOSIS — R63.6 UNDERWEIGHT: ICD-10-CM

## 2022-07-20 DIAGNOSIS — Z13.220 SCREENING CHOLESTEROL LEVEL: Primary | ICD-10-CM

## 2022-07-20 DIAGNOSIS — Z11.4 SCREENING FOR HIV (HUMAN IMMUNODEFICIENCY VIRUS): ICD-10-CM

## 2022-07-20 DIAGNOSIS — Z11.59 NEED FOR HEPATITIS C SCREENING TEST: ICD-10-CM

## 2022-07-20 DIAGNOSIS — Z00.00 ANNUAL PHYSICAL EXAM: ICD-10-CM

## 2022-07-20 DIAGNOSIS — L50.5 CHOLINERGIC URTICARIA: ICD-10-CM

## 2022-07-20 DIAGNOSIS — E55.9 VITAMIN D DEFICIENCY DISEASE: ICD-10-CM

## 2022-07-20 LAB
25(OH)D3 SERPL-MCNC: 29.8 NG/ML (ref 30–100)
ALBUMIN SERPL BCP-MCNC: 4.3 G/DL (ref 3.5–5)
ALP SERPL-CCNC: 44 U/L (ref 46–116)
ALT SERPL W P-5'-P-CCNC: 30 U/L (ref 12–78)
ANION GAP SERPL CALCULATED.3IONS-SCNC: 9 MMOL/L (ref 4–13)
AST SERPL W P-5'-P-CCNC: 20 U/L (ref 5–45)
BASOPHILS # BLD AUTO: 0.11 THOUSANDS/ΜL (ref 0–0.1)
BASOPHILS NFR BLD AUTO: 2 % (ref 0–1)
BILIRUB SERPL-MCNC: 0.54 MG/DL (ref 0.2–1)
BILIRUB UR QL STRIP: NEGATIVE
BUN SERPL-MCNC: 15 MG/DL (ref 5–25)
CALCIUM SERPL-MCNC: 8.7 MG/DL (ref 8.3–10.1)
CHLORIDE SERPL-SCNC: 100 MMOL/L (ref 96–108)
CHOLEST SERPL-MCNC: 184 MG/DL
CLARITY UR: NORMAL
CO2 SERPL-SCNC: 28 MMOL/L (ref 21–32)
COLOR UR: YELLOW
CREAT SERPL-MCNC: 0.82 MG/DL (ref 0.6–1.3)
EOSINOPHIL # BLD AUTO: 0.5 THOUSAND/ΜL (ref 0–0.61)
EOSINOPHIL NFR BLD AUTO: 8 % (ref 0–6)
ERYTHROCYTE [DISTWIDTH] IN BLOOD BY AUTOMATED COUNT: 11.9 % (ref 11.6–15.1)
GFR SERPL CREATININE-BSD FRML MDRD: 100 ML/MIN/1.73SQ M
GLUCOSE P FAST SERPL-MCNC: 85 MG/DL (ref 65–99)
GLUCOSE UR STRIP-MCNC: NEGATIVE MG/DL
HCT VFR BLD AUTO: 44.8 % (ref 34.8–46.1)
HCV AB SER QL: NORMAL
HDLC SERPL-MCNC: 88 MG/DL
HGB BLD-MCNC: 14.6 G/DL (ref 11.5–15.4)
HGB UR QL STRIP.AUTO: NEGATIVE
IMM GRANULOCYTES # BLD AUTO: 0.03 THOUSAND/UL (ref 0–0.2)
IMM GRANULOCYTES NFR BLD AUTO: 1 % (ref 0–2)
KETONES UR STRIP-MCNC: NEGATIVE MG/DL
LDLC SERPL CALC-MCNC: 85 MG/DL (ref 0–100)
LDLC SERPL DIRECT ASSAY-MCNC: 83 MG/DL (ref 0–100)
LEUKOCYTE ESTERASE UR QL STRIP: NEGATIVE
LYMPHOCYTES # BLD AUTO: 2.18 THOUSANDS/ΜL (ref 0.6–4.47)
LYMPHOCYTES NFR BLD AUTO: 37 % (ref 14–44)
MCH RBC QN AUTO: 30.3 PG (ref 26.8–34.3)
MCHC RBC AUTO-ENTMCNC: 32.6 G/DL (ref 31.4–37.4)
MCV RBC AUTO: 93 FL (ref 82–98)
MONOCYTES # BLD AUTO: 0.32 THOUSAND/ΜL (ref 0.17–1.22)
MONOCYTES NFR BLD AUTO: 5 % (ref 4–12)
NEUTROPHILS # BLD AUTO: 2.8 THOUSANDS/ΜL (ref 1.85–7.62)
NEUTS SEG NFR BLD AUTO: 47 % (ref 43–75)
NITRITE UR QL STRIP: NEGATIVE
NRBC BLD AUTO-RTO: 0 /100 WBCS
PH UR STRIP.AUTO: 7 [PH]
PLATELET # BLD AUTO: 353 THOUSANDS/UL (ref 149–390)
PMV BLD AUTO: 9.8 FL (ref 8.9–12.7)
POTASSIUM SERPL-SCNC: 3.7 MMOL/L (ref 3.5–5.3)
PROT SERPL-MCNC: 7.8 G/DL (ref 6.4–8.4)
PROT UR STRIP-MCNC: NEGATIVE MG/DL
RBC # BLD AUTO: 4.82 MILLION/UL (ref 3.81–5.12)
SODIUM SERPL-SCNC: 137 MMOL/L (ref 135–147)
SP GR UR STRIP.AUTO: 1.01 (ref 1–1.03)
TRIGL SERPL-MCNC: 57 MG/DL
TSH SERPL DL<=0.05 MIU/L-ACNC: 2.05 UIU/ML (ref 0.45–4.5)
UROBILINOGEN UR QL STRIP.AUTO: 0.2 E.U./DL
WBC # BLD AUTO: 5.94 THOUSAND/UL (ref 4.31–10.16)

## 2022-07-20 PROCEDURE — 83721 ASSAY OF BLOOD LIPOPROTEIN: CPT

## 2022-07-20 PROCEDURE — 84443 ASSAY THYROID STIM HORMONE: CPT

## 2022-07-20 PROCEDURE — 80061 LIPID PANEL: CPT

## 2022-07-20 PROCEDURE — 80053 COMPREHEN METABOLIC PANEL: CPT

## 2022-07-20 PROCEDURE — 85025 COMPLETE CBC W/AUTO DIFF WBC: CPT

## 2022-07-20 PROCEDURE — 86803 HEPATITIS C AB TEST: CPT

## 2022-07-20 PROCEDURE — 36415 COLL VENOUS BLD VENIPUNCTURE: CPT

## 2022-07-20 PROCEDURE — 81003 URINALYSIS AUTO W/O SCOPE: CPT

## 2022-07-20 PROCEDURE — 82306 VITAMIN D 25 HYDROXY: CPT

## 2022-07-20 PROCEDURE — 87389 HIV-1 AG W/HIV-1&-2 AB AG IA: CPT

## 2022-07-21 LAB — HIV 1+2 AB+HIV1 P24 AG SERPL QL IA: NORMAL

## 2022-07-22 ENCOUNTER — TELEPHONE (OUTPATIENT)
Dept: FAMILY MEDICINE CLINIC | Facility: CLINIC | Age: 22
End: 2022-07-22

## 2022-07-22 NOTE — TELEPHONE ENCOUNTER
Advised the patient that vitamin-D supplementation with an over-the-counter supplement could be risky due to her low body fat content    Suggested that she more dairy and get a limited amount of son to get her vitamin-D in the normal range

## 2023-03-03 ENCOUNTER — VBI (OUTPATIENT)
Dept: ADMINISTRATIVE | Facility: OTHER | Age: 23
End: 2023-03-03

## 2023-07-19 ENCOUNTER — VBI (OUTPATIENT)
Dept: ADMINISTRATIVE | Facility: OTHER | Age: 23
End: 2023-07-19

## 2023-09-29 ENCOUNTER — OFFICE VISIT (OUTPATIENT)
Dept: FAMILY MEDICINE CLINIC | Facility: CLINIC | Age: 23
End: 2023-09-29
Payer: COMMERCIAL

## 2023-09-29 VITALS
HEIGHT: 62 IN | DIASTOLIC BLOOD PRESSURE: 76 MMHG | TEMPERATURE: 99.1 F | BODY MASS INDEX: 15.64 KG/M2 | WEIGHT: 85 LBS | HEART RATE: 96 BPM | SYSTOLIC BLOOD PRESSURE: 126 MMHG | RESPIRATION RATE: 16 BRPM

## 2023-09-29 DIAGNOSIS — F31.70 BIPOLAR AFFECTIVE DISORDER IN REMISSION (HCC): ICD-10-CM

## 2023-09-29 DIAGNOSIS — B36.0 TINEA VERSICOLOR: ICD-10-CM

## 2023-09-29 DIAGNOSIS — Z00.00 ROUTINE GENERAL MEDICAL EXAMINATION AT A HEALTH CARE FACILITY: Primary | ICD-10-CM

## 2023-09-29 DIAGNOSIS — J40 BRONCHITIS: ICD-10-CM

## 2023-09-29 DIAGNOSIS — R63.6 UNDERWEIGHT: ICD-10-CM

## 2023-09-29 PROCEDURE — 99395 PREV VISIT EST AGE 18-39: CPT | Performed by: FAMILY MEDICINE

## 2023-09-29 PROCEDURE — 99214 OFFICE O/P EST MOD 30 MIN: CPT | Performed by: FAMILY MEDICINE

## 2023-09-29 RX ORDER — METHYLPREDNISOLONE 4 MG/1
TABLET ORAL
Qty: 21 EACH | Refills: 0 | Status: SHIPPED | OUTPATIENT
Start: 2023-09-29

## 2023-09-29 RX ORDER — KETOCONAZOLE 20 MG/ML
1 SHAMPOO TOPICAL 2 TIMES WEEKLY
Qty: 120 ML | Refills: 1 | Status: SHIPPED | OUTPATIENT
Start: 2023-10-02

## 2023-09-29 RX ORDER — DEXTROMETHORPHAN HBR. AND GUAIFENESIN 10; 100 MG/5ML; MG/5ML
5 SOLUTION ORAL EVERY 12 HOURS
Qty: 180 ML | Refills: 0 | Status: SHIPPED | OUTPATIENT
Start: 2023-09-29

## 2023-09-29 RX ORDER — AZITHROMYCIN 250 MG/1
TABLET, FILM COATED ORAL
Qty: 6 TABLET | Refills: 0 | Status: SHIPPED | OUTPATIENT
Start: 2023-09-29 | End: 2023-10-04

## 2024-02-21 PROBLEM — N30.00 ACUTE CYSTITIS WITHOUT HEMATURIA: Status: RESOLVED | Noted: 2020-06-05 | Resolved: 2024-02-21

## 2024-07-22 ENCOUNTER — VBI (OUTPATIENT)
Dept: ADMINISTRATIVE | Facility: OTHER | Age: 24
End: 2024-07-22

## 2024-07-22 NOTE — TELEPHONE ENCOUNTER
07/22/24 2:07 PM     Chart reviewed for Pap Smear (HPV) aka Cervical Cancer Screening ; nothing is submitted to the patient's insurance at this time.     Yahaira Fox   [facility]

## 2024-07-29 ENCOUNTER — OFFICE VISIT (OUTPATIENT)
Dept: FAMILY MEDICINE CLINIC | Facility: CLINIC | Age: 24
End: 2024-07-29
Payer: COMMERCIAL

## 2024-07-29 VITALS
RESPIRATION RATE: 20 BRPM | BODY MASS INDEX: 15.64 KG/M2 | HEIGHT: 62 IN | DIASTOLIC BLOOD PRESSURE: 68 MMHG | TEMPERATURE: 97.5 F | SYSTOLIC BLOOD PRESSURE: 120 MMHG | HEART RATE: 68 BPM | WEIGHT: 85 LBS

## 2024-07-29 DIAGNOSIS — G47.00 INSOMNIA, UNSPECIFIED TYPE: ICD-10-CM

## 2024-07-29 DIAGNOSIS — F41.9 ANXIETY: Primary | ICD-10-CM

## 2024-07-29 PROCEDURE — 99214 OFFICE O/P EST MOD 30 MIN: CPT | Performed by: FAMILY MEDICINE

## 2024-07-29 RX ORDER — HYDROXYZINE PAMOATE 25 MG/1
25 CAPSULE ORAL 3 TIMES DAILY PRN
Qty: 90 CAPSULE | Refills: 1 | Status: SHIPPED | OUTPATIENT
Start: 2024-07-29

## 2024-07-29 RX ORDER — DOXEPIN HYDROCHLORIDE 10 MG/1
10 CAPSULE ORAL
Qty: 30 CAPSULE | Refills: 1 | Status: SHIPPED | OUTPATIENT
Start: 2024-07-29

## 2024-07-29 RX ORDER — LORAZEPAM 0.5 MG/1
0.5 TABLET ORAL EVERY 12 HOURS PRN
Qty: 60 TABLET | Refills: 1 | Status: CANCELLED | OUTPATIENT
Start: 2024-07-29

## 2024-07-29 NOTE — PROGRESS NOTES
Assessment/Plan: Patient complains of anxiety and stress question of patient on which medication she has tried in the past.  She states she has been on Paxil but she is not depressed and has discontinued Paxil.  Will begin Vistaril 25 mg 3 times daily as needed and refer to behavioral health.  Depression has a history of intentional drug overdose.  The patient has a history of prolonged QT intervals on EKG.    Primary insomnia will begin doxepin 10 mg at bedtime    Underweight with a BMI of 15.55 the patient was advised to gain weight    Problem List Items Addressed This Visit    None  Visit Diagnoses     Anxiety    -  Primary    Relevant Medications    hydrOXYzine pamoate (VISTARIL) 25 mg capsule    Other Relevant Orders    Ambulatory referral to Psych Services    Insomnia, unspecified type        Relevant Medications    doxepin (SINEquan) 10 mg capsule             Diagnoses and all orders for this visit:    Anxiety  -     Ambulatory referral to Psych Services; Future  -     hydrOXYzine pamoate (VISTARIL) 25 mg capsule; Take 1 capsule (25 mg total) by mouth 3 (three) times a day as needed for itching    Insomnia, unspecified type  -     doxepin (SINEquan) 10 mg capsule; Take 1 capsule (10 mg total) by mouth daily at bedtime        No problem-specific Assessment & Plan notes found for this encounter.      PHQ-2/9 Depression Screening    Little interest or pleasure in doing things: 1 - several days  Feeling down, depressed, or hopeless: 1 - several days  PHQ-2 Score: 2  PHQ-2 Interpretation: Negative depression screen          Body mass index is 15.55 kg/m².    BMI Counseling: Body mass index is 15.55 kg/m². The BMI is below normal. Patient was advised to gain weight.    Subjective:      Patient ID: Cassie Gauthier is a 24 y.o. female.    Patient presents with a chief complaint of anxiety stress and insomnia.  The patient has discontinued Paxil.  The patient states she is not depressed she is anxious and  "nervous    Anxiety  Symptoms include nervous/anxious behavior. Patient reports no chest pain, palpitations or shortness of breath.           The following portions of the patient's history were reviewed and updated as appropriate:   She has a past medical history of Anxiety, Asthma, Depression, and Substance abuse ([facility],  does not have any pertinent problems on file.,   has no past surgical history on file.,  family history is not on file.,   reports that she has never smoked. She has never used smokeless tobacco. She reports current alcohol use. She reports current drug use. Drug: Marijuana.,  is allergic to iodine - food allergy and lamictal [lamotrigine]..  Current Outpatient Medications   Medication Sig Dispense Refill   • doxepin (SINEquan) 10 mg capsule Take 1 capsule (10 mg total) by mouth daily at bedtime 30 capsule 1   • hydrOXYzine pamoate (VISTARIL) 25 mg capsule Take 1 capsule (25 mg total) by mouth 3 (three) times a day as needed for itching 90 capsule 1     No current facility-administered medications for this visit.       Review of Systems   Constitutional:  Negative for chills and fever.   HENT:  Negative for ear pain and sore throat.    Eyes:  Negative for pain and visual disturbance.   Respiratory:  Negative for cough and shortness of breath.    Cardiovascular:  Negative for chest pain and palpitations.   Gastrointestinal:  Negative for abdominal pain and vomiting.   Genitourinary:  Negative for dysuria and hematuria.   Musculoskeletal:  Negative for arthralgias and back pain.   Skin:  Negative for color change and rash.   Neurological:  Negative for seizures and syncope.   Psychiatric/Behavioral:  Positive for sleep disturbance. The patient is nervous/anxious.    All other systems reviewed and are negative.        Objective:    /68   Pulse 68   Temp 97.5 °F (36.4 °C)   Resp 20   Ht 5' 2\" (1.575 m)   Wt 38.6 kg (85 lb)   BMI 15.55 kg/m²   Body mass index is 15.55 kg/m².     Physical " Exam  Vitals and nursing note reviewed.   Constitutional:       Appearance: Normal appearance. She is well-developed.      Comments: Underweight   HENT:      Head: Normocephalic and atraumatic.      Right Ear: Tympanic membrane, ear canal and external ear normal.      Left Ear: Tympanic membrane, ear canal and external ear normal.      Nose: Nose normal.      Mouth/Throat:      Mouth: Mucous membranes are moist.      Pharynx: Oropharynx is clear.   Eyes:      Extraocular Movements: Extraocular movements intact.      Conjunctiva/sclera: Conjunctivae normal.      Pupils: Pupils are equal, round, and reactive to light.   Cardiovascular:      Rate and Rhythm: Normal rate and regular rhythm.      Pulses: Normal pulses.      Heart sounds: Normal heart sounds.   Pulmonary:      Effort: Pulmonary effort is normal.      Breath sounds: Normal breath sounds.   Abdominal:      General: Abdomen is flat. Bowel sounds are normal.      Palpations: Abdomen is soft.      Tenderness: There is no abdominal tenderness.   Musculoskeletal:         General: Normal range of motion.      Cervical back: Normal range of motion and neck supple.   Skin:     General: Skin is warm and dry.      Capillary Refill: Capillary refill takes less than 2 seconds.   Neurological:      General: No focal deficit present.      Mental Status: She is alert and oriented to person, place, and time.   Psychiatric:         Mood and Affect: Mood normal.         Behavior: Behavior normal.         Thought Content: Thought content normal.         Judgment: Judgment normal.

## 2024-07-30 ENCOUNTER — TELEPHONE (OUTPATIENT)
Age: 24
End: 2024-07-30

## 2024-07-30 NOTE — TELEPHONE ENCOUNTER
Contacted patient in regards to Routine Referral in attempts to verify patient's needs of services and add patient to proper wait list. Writer verified Full Name, , Callback Number, Address, and Insurance. Writer spoke with patient who stated she is interested in being added to med mgmt wait list.    1st call attempt, closed, Referral Completed    Email: [email or web address]  *requesting outside resource packet

## 2024-08-28 ENCOUNTER — APPOINTMENT (OUTPATIENT)
Dept: URGENT CARE | Facility: MEDICAL CENTER | Age: 24
End: 2024-08-28

## 2024-09-19 ENCOUNTER — TELEPHONE (OUTPATIENT)
Age: 24
End: 2024-09-19

## 2024-09-19 NOTE — TELEPHONE ENCOUNTER
Contacted patient off of Medication Management  to verify needs of services in attempts to offer patient an appointment. LVM for patient to contact intake dept  in regards to scheduling off wait list.     1st attempt.

## 2024-09-24 NOTE — TELEPHONE ENCOUNTER
Contacted patient off of Medication Management  to verify needs of services in attempts to offer patient an available appointment. LVM for patient to contact intake dept  in regards to wait list.     2nd attempt, pt removed from wait list at this time.

## 2024-12-18 NOTE — PROGRESS NOTES
[facility] PRIMARY CARE    NAME: Rony Fox  AGE: 20 y.o. SEX: female  : 2000     DATE: 2023     Assessment and Plan: Bronchitis ongoing more than 2 weeks we will provide a Medrol Dosepak Zithromax Z-Jw and Robitussin-DM    Tinea versicolor we will provide Nizoral shampoo to be used twice weekly    Underweight discussed diet and encouraged to eat more often    Bipolar affective disorder the patient has discontinued Paxil and Topamax the disorder seems to be in remission     Problem List Items Addressed This Visit    None  Visit Diagnoses     Routine general medical examination at a health care facility    -  Primary    Bronchitis        Relevant Medications    methylPREDNISolone 4 MG tablet therapy pack    azithromycin (Zithromax) 250 mg tablet    dextromethorphan-guaiFENesin (ROBITUSSIN-DM)  mg/5 mL oral liquid    Tinea versicolor        Relevant Medications    ketoconazole (NIZORAL) 2 % shampoo (Start on 10/2/2023)    Underweight        Bipolar affective disorder in remission ([address])              Immunizations and preventive care screenings were discussed with patient today. Appropriate education was printed on patient's after visit summary. Counseling:  Alcohol/drug use: discussed moderation in alcohol intake, the recommendations for healthy alcohol use, and avoidance of illicit drug use. Dental Health: discussed importance of regular tooth brushing, flossing, and dental visits. Injury prevention: discussed safety/seat belts, safety helmets, smoke detectors, carbon dioxide detectors, and smoking near bedding or upholstery. Sexual health: discussed sexually transmitted diseases, partner selection, use of condoms, avoidance of unintended pregnancy, and contraceptive alternatives. Exercise: the importance of regular exercise/physical activity was discussed.  Recommend exercise 3-5 times per week for at least 30 Copied from CRM #[id number]. Topic: MW Paging/Messaging - MW Paging Healthcare Rep/Clinician Request  >> Dec 18, 2024  2:09 PM Tyesha OBRIEN wrote:  sugar with provider/healthcare rep to provider call:    Regarding CASIE HARRIS during Working Hrs;  Selected 'Wrap Up CRM' and created new Telephone Encounter after clicking 'Convert to Clinical Call'.  Sent Pt Message template, selected Reason for Call Other and input in the specific reason in the comments.     Message Not Urgent: Routed as routine priority per Clinician KB page to appropriate clinician pool. Readback full message.-- DO NOT REPLY / DO NOT REPLY ALL --  -- This inbox is not monitored. If this was sent to the wrong provider or department, reroute message to P ECO Reroute pool. --  -- Message is from Engagement Center Operations (ECO) --    General Patient Message: Mrs. Wooten from [facility] is calling stating she has been trying to fax over some results for patient however the fax number is busy . Sugar states is there another way documentation can be sent   Caller Information       Contact Date/Time Type Contact Phone/Fax    12/18/2024 02:05 PM CST Phone (Incoming) sugar [phone]     [facility]            Alternative phone number: n/a    Can a detailed message be left? Yes - Voicemail   Patient has been advised the message will be addressed within 2-3 business days.                 minutes. BMI Counseling: Body mass index is 15.55 kg/m². The BMI is below normal. Patient advised to gain weight and dietary education for weight gain was provided. Rationale for BMI follow-up plan is due to patient being underweight. Depression Screening and Follow-up Plan: Patient was screened for depression during today's encounter. They screened negative with a PHQ-2 score of 0. Return in about 6 months (around 3/29/2024). Chief Complaint:     Chief Complaint   Patient presents with   • Annual Exam   • Cough     X 2 week. • Skin Discoloration     Back and few spots on stomach      History of Present Illness:     Adult Annual Physical   Patient here for a comprehensive physical exam. The patient reports problems - cough rash. Diet and Physical Activity  Diet/Nutrition: poor diet. Exercise: 5-7 times a week on average and less than 30 minutes on average. Depression Screening  PHQ-2/9 Depression Screening    Little interest or pleasure in doing things: 0 - not at all  Feeling down, depressed, or hopeless: 0 - not at all  PHQ-2 Score: 0  PHQ-2 Interpretation: Negative depression screen       General Health  Sleep: gets 7-8 hours of sleep on average. Hearing: normal - bilateral.  Vision: no vision problems. Dental: no dental visits for >1 year, brushes teeth twice daily and does not floss. /GYN Health  Last menstrual period: months  Contraceptive method: barrier methods. History of STDs?: no.     Review of Systems:     Review of Systems   Constitutional: Positive for fatigue. Negative for chills and fever. HENT: Negative for ear pain and sore throat. Eyes: Negative for pain and visual disturbance. Respiratory: Positive for cough. Negative for shortness of breath. Ongoing cough for 2 weeks   Cardiovascular: Negative for chest pain and palpitations. Gastrointestinal: Negative for abdominal pain and vomiting.    Genitourinary: Negative for dysuria and hematuria. Musculoskeletal: Negative for arthralgias and back pain. Skin: Positive for rash. Negative for color change. Neurological: Negative for seizures and syncope. Psychiatric/Behavioral: Positive for dysphoric mood. The patient is nervous/anxious. All other systems reviewed and are negative. Past Medical History:     Past Medical History:   Diagnosis Date   • Anxiety    • Asthma    • Depression    • Substance abuse ([address])       Past Surgical History:     History reviewed. No pertinent surgical history. Social History:     Social History     Socioeconomic History   • Marital status: Single     Spouse name: None   • Number of children: None   • Years of education: None   • Highest education level: None   Occupational History   • None   Tobacco Use   • Smoking status: Never   • Smokeless tobacco: Never   Vaping Use   • Vaping Use: Every day   • Substances: Nicotine, CBD, Flavoring   Substance and Sexual Activity   • Alcohol use: Yes     Comment: socially   • Drug use: Yes     Types: Marijuana     Comment: 2-3x a week    • Sexual activity: None   Other Topics Concern   • None   Social History Narrative   • None     Social Determinants of Health     Financial Resource Strain: Not on file   Food Insecurity: Not on file   Transportation Needs: Not on file   Physical Activity: Not on file   Stress: Not on file   Social Connections: Not on file   Intimate Partner Violence: Not on file   Housing Stability: Not on file      Family History:     History reviewed. No pertinent family history. Current Medications:     Current Outpatient Medications   Medication Sig Dispense Refill   • azithromycin (Zithromax) 250 mg tablet Take 2 tablets (500 mg total) by mouth daily for 1 day, THEN 1 tablet (250 mg total) daily for 4 days.  6 tablet 0   • dextromethorphan-guaiFENesin (ROBITUSSIN-DM)  mg/5 mL oral liquid Take 5 mL by mouth every 12 (twelve) hours 180 mL 0   • [START ON 10/2/2023] ketoconazole (NIZORAL) 2 % shampoo Apply 1 Application topically 2 (two) times a week 120 mL 1   • methylPREDNISolone 4 MG tablet therapy pack Use as directed on package 21 each 0   • cetirizine (ZyrTEC) 10 mg tablet Take 1 tablet (10 mg total) by mouth daily (Patient not taking: Reported on 9/29/2023) 90 tablet 1   • famotidine (PEPCID) 20 mg tablet Take 1 tablet (20 mg total) by mouth 2 (two) times a day (Patient not taking: Reported on 7/8/2022) 180 tablet 1   • hydrOXYzine HCL (ATARAX) 50 mg tablet Take 50 mg by mouth daily at bedtime (Patient not taking: Reported on 9/29/2023)     • ondansetron (ZOFRAN) 4 mg tablet Take 1 tablet (4 mg total) by mouth every 6 (six) hours (Patient not taking: Reported on 9/29/2023) 12 tablet 0   • PARoxetine (PAXIL) 10 mg tablet Take 10 mg by mouth in the morning Take 10mg paxil with 40mg to equal 50mg daily (Patient not taking: Reported on 9/29/2023)     • PARoxetine (PAXIL) 40 MG tablet Take 40 mg by mouth every morning (Patient not taking: Reported on 9/29/2023)     • topiramate (TOPAMAX) 50 MG tablet Take 50 mg by mouth daily (Patient not taking: Reported on 9/29/2023)       No current facility-administered medications for this visit. Allergies: Allergies   Allergen Reactions   • Iodine - Food Allergy Anaphylaxis     Shell fish   • Lamictal [Lamotrigine] Hives      Physical Exam:     /76   Pulse 96   Temp 99.1 °F (37.3 °C)   Resp 16   Ht 5' 2" (1.575 m)   Wt 38.6 kg (85 lb)   BMI 15.55 kg/m²     Physical Exam  Vitals and nursing note reviewed. Constitutional:       General: She is not in acute distress. Appearance: She is well-developed. Comments: Underweight   HENT:      Head: Normocephalic and atraumatic. Right Ear: Tympanic membrane, ear canal and external ear normal.      Left Ear: Tympanic membrane, ear canal and external ear normal.      Nose: Nose normal.      Mouth/Throat:      Mouth: Mucous membranes are moist.      Pharynx: Oropharynx is clear.    Eyes: Extraocular Movements: Extraocular movements intact. Conjunctiva/sclera: Conjunctivae normal.      Pupils: Pupils are equal, round, and reactive to light. Cardiovascular:      Rate and Rhythm: Normal rate and regular rhythm. Heart sounds: No murmur heard. Pulmonary:      Effort: Pulmonary effort is normal. No respiratory distress. Breath sounds: Normal breath sounds. Abdominal:      General: Abdomen is flat. Bowel sounds are normal.      Palpations: Abdomen is soft. Tenderness: There is no abdominal tenderness. Musculoskeletal:         General: No swelling. Normal range of motion. Cervical back: Normal range of motion and neck supple. Skin:     General: Skin is warm and dry. Capillary Refill: Capillary refill takes less than 2 seconds. Neurological:      General: No focal deficit present. Mental Status: She is alert and oriented to person, place, and time. Psychiatric:         Mood and Affect: Mood normal.         Behavior: Behavior normal.         Thought Content:  Thought content normal.         Judgment: Judgment normal.          Mike Enamorado,    [facility] PRIMARY CARE